# Patient Record
Sex: FEMALE | Race: WHITE | Employment: OTHER | URBAN - METROPOLITAN AREA
[De-identification: names, ages, dates, MRNs, and addresses within clinical notes are randomized per-mention and may not be internally consistent; named-entity substitution may affect disease eponyms.]

---

## 2017-04-11 ENCOUNTER — OFFICE VISIT (OUTPATIENT)
Dept: ONCOLOGY | Age: 62
End: 2017-04-11

## 2017-04-11 VITALS
SYSTOLIC BLOOD PRESSURE: 126 MMHG | OXYGEN SATURATION: 97 % | HEART RATE: 85 BPM | RESPIRATION RATE: 18 BRPM | TEMPERATURE: 97.3 F | BODY MASS INDEX: 55.32 KG/M2 | HEIGHT: 61 IN | DIASTOLIC BLOOD PRESSURE: 76 MMHG | WEIGHT: 293 LBS

## 2017-04-11 DIAGNOSIS — C50.912 MALIGNANT NEOPLASM OF LEFT FEMALE BREAST, UNSPECIFIED SITE OF BREAST: Primary | ICD-10-CM

## 2017-04-11 DIAGNOSIS — R23.2 HOT FLASHES RELATED TO AROMATASE INHIBITOR THERAPY: ICD-10-CM

## 2017-04-11 DIAGNOSIS — F32.A DEPRESSION, UNSPECIFIED DEPRESSION TYPE: ICD-10-CM

## 2017-04-11 DIAGNOSIS — T45.1X5A HOT FLASHES RELATED TO AROMATASE INHIBITOR THERAPY: ICD-10-CM

## 2017-04-11 RX ORDER — FAMOTIDINE 20 MG/1
20 TABLET, FILM COATED ORAL 2 TIMES DAILY
COMMUNITY
End: 2020-09-14

## 2017-04-11 RX ORDER — OXYCODONE AND ACETAMINOPHEN 10; 325 MG/1; MG/1
TABLET ORAL
COMMUNITY
End: 2018-11-07 | Stop reason: ALTCHOICE

## 2017-04-11 RX ORDER — LIDOCAINE HYDROCHLORIDE 20 MG/ML
15 SOLUTION OROPHARYNGEAL AS NEEDED
COMMUNITY
End: 2020-02-25

## 2017-04-11 RX ORDER — NAPROXEN 375 MG/1
375 TABLET ORAL 2 TIMES DAILY WITH MEALS
COMMUNITY
End: 2018-11-20 | Stop reason: ALTCHOICE

## 2017-04-11 RX ORDER — TOLTERODINE 4 MG/1
4 CAPSULE, EXTENDED RELEASE ORAL DAILY
COMMUNITY
End: 2020-02-25

## 2017-04-11 RX ORDER — CARVEDILOL 3.12 MG/1
TABLET ORAL 2 TIMES DAILY WITH MEALS
COMMUNITY
End: 2020-02-25

## 2017-04-11 RX ORDER — KETOCONAZOLE 20 MG/G
CREAM TOPICAL DAILY
COMMUNITY
End: 2020-09-14

## 2017-04-11 RX ORDER — LISINOPRIL 5 MG/1
TABLET ORAL DAILY
COMMUNITY
End: 2020-02-25

## 2017-04-11 RX ORDER — ACETAMINOPHEN 500 MG
1000 TABLET ORAL 3 TIMES DAILY
Status: ON HOLD | COMMUNITY
End: 2020-09-18

## 2017-04-11 RX ORDER — FUROSEMIDE 40 MG/1
TABLET ORAL 2 TIMES DAILY
COMMUNITY
End: 2020-02-25

## 2017-04-11 RX ORDER — SILVER SULFADIAZINE 10 G/1000G
CREAM TOPICAL DAILY
COMMUNITY
End: 2020-09-14

## 2017-04-11 RX ORDER — METFORMIN HYDROCHLORIDE 850 MG/1
TABLET ORAL 2 TIMES DAILY WITH MEALS
COMMUNITY
End: 2020-02-25

## 2017-04-11 RX ORDER — DIAZEPAM 5 MG/1
5 TABLET ORAL DAILY
COMMUNITY
End: 2020-09-21

## 2017-04-11 RX ORDER — SUCRALFATE 1 G/10ML
SUSPENSION ORAL 4 TIMES DAILY
COMMUNITY
End: 2018-11-07 | Stop reason: ALTCHOICE

## 2017-04-11 NOTE — PROGRESS NOTES
[id number] [facility]  [phone]    Hematology / Oncology Followup    Reason for Visit:   Ever Fu is a 62 y.o. female who is seen for follow up of breast cancer. Treatment History:   · Mammogram and ultrasound 4/25/2014 at [facility]: BIRADS 5 left breast abnormality, increasing in size compared with exam 6 months prior  · Patient declined preoperative biopsy  · Left lumpectomy and sentinel node dissection 5/28/2014 with Dr. Niki Sher: 2cm infiltrating ductal carcinoma, grade 3, %, NH 30%, HER2/jesusita negative, Ki67 30%, 0/12 nodes. Prosigna score 77 = high risk. · Stage IA (pT1c pN0 Mx) Left Breast Cancer  · Chemotherapy with TC x4 cycles from 7/10/14 to 9/11/2014  · Adjuvant radiation with Dr. Yen Mccoy 11/12/14 - 12/17/14  · Adjuvant endocrine therapy with Anastrozole beginning 1/3/2015     History of Present Illness:   She reports some problems with urinary incontinence, following with urology. Passed a kidney stone since last here as well. Getting home health with wound care to address a wound in her skin folds. She continues to take anastrozole, compliant with therapy. Tolerating it well. Hot flashes only occasionally, tolerable. No breast complaints. PAST HISTORY: The following sections were reviewed and updated in the EMR as appropriate: PMH, SH, FH, Medications, Allergies. She lives with her  in [facility], about an hour from here. She is confined to a wheelchair/scooter secondary to her morbid obesity and associated arthritis, but is able to get up to transfer. No Known Allergies     Review of Systems: A complete review of systems was obtained, reviewed, and scanned into the EMR. Pertinent findings reviewed above.       Physical Exam:     Visit Vitals    /76 (BP 1 Location: Right arm, BP Patient Position: Sitting)    Pulse 85    Temp 97.3 °F (36.3 °C) (Oral)    Resp 18    Ht 5' 1\" (1.549 m)    Wt (!) 368 lb (166.9 kg)    SpO2 97%    BMI 69.53 kg/m2     ECOG PS: 0  General: No distress  Eyes: PERRLA, anicteric sclerae  HENT: Atraumatic, OP clear. Neck: Supple  Lymphatic: No cervical, supraclavicular, or inguinal adenopathy  Respiratory: CTAB, normal respiratory effort  CV: Normal rate and rhythm, no murmurs, 1+ peripheral edema  GI: Soft, morbidly obese, nontender, nondistended, no masses. MS: Sitting in wheelchair. Digits without clubbing or cyanosis. Skin: No ecchymoses, or petechiae. Normal temperature, turgor, and texture. Psych: Alert, oriented, appropriate affect, normal judgment/insight    Results:     Lab Results   Component Value Date/Time    WBC 7.9 09/30/2014 09:10 AM    HGB 11.2 09/30/2014 09:10 AM    HCT 34.0 09/30/2014 09:10 AM    PLATELET 079 82/85/9615 09:10 AM    MCV 90.9 09/30/2014 09:10 AM    ABS. NEUTROPHILS 5.3 09/30/2014 09:10 AM    Hemoglobin (POC) 10.2 09/19/2014 02:56 PM    Hematocrit (POC) 30 09/19/2014 02:56 PM     Lab Results   Component Value Date/Time    Sodium 140 05/23/2014 02:40 PM    Potassium 4.4 05/23/2014 02:40 PM    Chloride 102 05/23/2014 02:40 PM    CO2 29 05/23/2014 02:40 PM    Glucose 143 05/23/2014 02:40 PM    BUN 17 05/23/2014 02:40 PM    Creatinine 0.61 05/23/2014 02:40 PM    GFR est AA >60 05/23/2014 02:40 PM    GFR est non-AA >60 05/23/2014 02:40 PM    Calcium 9.2 05/23/2014 02:40 PM    Sodium (POC) 138 09/19/2014 02:56 PM    Potassium (POC) 4.5 09/19/2014 02:56 PM    Chloride (POC) 102 09/19/2014 02:56 PM    Glucose (POC) 102 09/19/2014 02:56 PM    BUN (POC) 19 09/19/2014 02:56 PM    Creatinine (POC) 1.1 09/19/2014 02:56 PM    Calcium, ionized (POC) 1.17 09/19/2014 02:56 PM     Lab Results   Component Value Date/Time    Bilirubin, total 0.4 05/23/2014 02:40 PM    ALT (SGPT) 38 05/23/2014 02:40 PM    AST (SGOT) 22 05/23/2014 02:40 PM    Alk.  phosphatase 91 05/23/2014 02:40 PM    Protein, total 7.1 05/23/2014 02:40 PM    Albumin 3.4 05/23/2014 02:40 PM Globulin 3.7 05/23/2014 02:40 PM       DEXA 11/11/2014: normal   DEXA 11/03/2016: normal    Mammogram 7/07/2015: benign  Mammogram 6/28/2016: benign      Assessment:   1) Breast Cancer, Left  Stage IA, ER/OR positive, HER2 negative, high risk prosigna score  She is s/p resection followed by adjuvant chemotherapy with TC x 4 and adjuvant radiation. She is now on adjuvant endocrine therapy with anastrozole, with plans for 5 years of therapy. She is tolerating therapy well. She has no evidence of disease recurrence at this time. We will continue with therapy and active surveillance. 2) Bone Health  Increased risk of osteoporosis while on AI therapy. Continue calcium and vitamin D. Last DEXA normal, repeat every 2 years while on AI. 3) Depression  Doing better. Seeing psychiatry tomorrow. 4) Hot flashes  Mild. Likely related to AI. Monitor for now.     Plan:     · Continue anastrazole 1mg PO daily through 1/2020  · Continue Calcium, Vitamin D  · Mammogram yearly, due 6/2017 at time of surgery appt  · Return to see me in 9/2017      Signed By: Romain Coronado MD     April 11, 2017

## 2017-04-11 NOTE — MR AVS SNAPSHOT
Visit Information Date & Time Provider Department Dept. Phone Encounter #  
 4/11/2017 10:00 AM Kevin Nelson MD [facility] at [address] [place] [address] [id number] Follow-up Instructions Return in about 5 months (around 9/11/2017) for Lida breast AI fu.  
 Follow-up and Disposition History Upcoming Health Maintenance Date Due Hepatitis C Screening 1955 DTaP/Tdap/Td series (1 - Tdap) 9/4/1977 PAP AKA CERVICAL CYTOLOGY 9/4/1977 FOBT Q 1 YEAR AGE 50-75 9/4/2006 Pneumococcal 19-64 Highest Risk (2 of 3 - PCV13) 7/31/2013 INFLUENZA AGE 9 TO ADULT 8/1/2016 BREAST CANCER SCRN MAMMOGRAM 6/28/2017 Allergies as of 4/11/2017  Review Complete On: 4/11/2017 By: Kevin Nelson MD  
 No Known Allergies Current Immunizations  Reviewed on 9/19/2014 Name Date Influenza Vaccine 3/12/2014 Pneumococcal Vaccine (Unspecified Type) 7/31/2012 Zoster Vaccine, Live 3/12/2014 Not reviewed this visit You Were Diagnosed With   
  
 Codes Comments Malignant neoplasm of left female breast, unspecified site of breast ([facility] [address]    -  Primary ICD-10-CM: X68.526 ICD-9-CM: 174.9 Hot flashes related to aromatase inhibitor therapy     ICD-10-CM: R23.2, T45.1X5A 
ICD-9-CM: 782.62, E933.1 Depression, unspecified depression type     ICD-10-CM: F32.9 ICD-9-CM: 683 Vitals BP Pulse Temp Resp Height(growth percentile) Weight(growth percentile) 126/76 (BP 1 Location: Right arm, BP Patient Position: Sitting) 85 97.3 °F (36.3 °C) (Oral) 18 5' 1\" (1.549 m) (!) 368 lb (166.9 kg) SpO2 BMI OB Status Smoking Status 97% 69.53 kg/m2 Postmenopausal Former Smoker BMI and BSA Data Body Mass Index Body Surface Area  
 69.53 kg/m 2 2.68 m 2 Preferred Pharmacy Pharmacy Name Phone [facility] [address] [facility], [address] Your Updated Medication List  
  
   
 This list is accurate as of: 4/11/17 10:45 AM.  Always use your most recent med list.  
  
  
  
  
 acetaminophen 500 mg tablet Commonly known as:  TYLENOL Take 1,000 mg by mouth three (3) times daily. anastrozole 1 mg tablet Commonly known as:  ARIMIDEX TAKE ONE TABLET BY MOUTH ONCE DAILY CALCIUM 600 PO Take  by mouth. CARAFATE 100 mg/mL suspension Generic drug:  sucralfate Take  by mouth four (4) times daily. carvedilol 3.125 mg tablet Commonly known as:  Kelton Pulse Take  by mouth two (2) times daily (with meals). diazePAM 5 mg tablet Commonly known as:  VALIUM Take 5 mg by mouth daily. famotidine 20 mg tablet Commonly known as:  PEPCID Take 20 mg by mouth two (2) times a day. furosemide 40 mg tablet Commonly known as:  LASIX Take  by mouth two (2) times a day. GLUCOPHAGE 850 mg tablet Generic drug:  metFORMIN Take  by mouth two (2) times daily (with meals). GLUCOSAMINE 1500 COMPLEX PO Take  by mouth.  
  
 ketoconazole 2 % topical cream  
Commonly known as:  NIZORAL Apply  to affected area daily. LIDOCAINE VISCOUS 2 % solution Generic drug:  lidocaine Take 15 mL by mouth as needed for Pain. LIPITOR 20 mg tablet Generic drug:  atorvastatin Take  by mouth daily. lisinopril 5 mg tablet Commonly known as:  Rekha Dio Take  by mouth daily. naproxen 375 mg tablet Commonly known as:  NAPROSYN Take 375 mg by mouth two (2) times daily (with meals). NASONEX 50 mcg/actuation nasal spray Generic drug:  mometasone 2 Sprays daily. NYSTOP powder Generic drug:  nystatin Apply  to affected area as needed. oxyCODONE-acetaminophen  mg per tablet Commonly known as:  PERCOCET 10 Take  by mouth every six (6) hours as needed for Pain.  
  
 pantoprazole 20 mg tablet Commonly known as:  PROTONIX Take 1 Tab by mouth two (2) times a day. potassium chloride 20 mEq tablet Commonly known as:  K-DUR, KLOR-CON Take  by mouth two (2) times a day. rOPINIRole 2 mg tablet Commonly known as:  Rico Sicks Take  by mouth nightly. sertraline 100 mg tablet Commonly known as:  ZOLOFT Take 2 Tabs by mouth daily. SILVADENE 1 % topical cream  
Generic drug:  silver sulfADIAZINE Apply  to affected area daily. SYNTHROID 100 mcg tablet Generic drug:  levothyroxine Take 125 mcg by mouth Daily (before breakfast). tolterodine ER 4 mg ER capsule Commonly known as:  Claudio Dasen Take 4 mg by mouth daily. TYLENOL COLD AND FLU SEVERE 5--200 mg Tab Generic drug:  elrvmlbve-kq-tscswmoy-guaifen Take  by mouth. VENTOLIN HFA 90 mcg/actuation inhaler Generic drug:  albuterol Take  by inhalation as needed. ZyrTEC 10 mg tablet Generic drug:  cetirizine Take  by mouth daily. Follow-up Instructions Return in about 5 months (around 9/11/2017) for darrell Hilton.  
  
  
Introducing [facility] SERVICES! Dear Fernandez Champion: 
Thank you for requesting a Subway account. Our records indicate that you already have an active Subway account. You can access your account anytime at [email or web address] Did you know that you can access your hospital and ER discharge instructions at any time in Subway? You can also review all of your test results from your hospital stay or ER visit. Additional Information If you have questions, please visit the Frequently Asked Questions section of the Subway website at [email or web address] Remember, Subway is NOT to be used for urgent needs. For medical emergencies, dial 911. Now available from your iPhone and Android! Please provide this summary of care documentation to your next provider. Your primary care clinician is listed as Harini Smith.  If you have any questions after today's visit, please call [phone].

## 2017-07-13 DIAGNOSIS — Z12.39 BREAST CANCER SCREENING: Primary | ICD-10-CM

## 2017-07-20 ENCOUNTER — OFFICE VISIT (OUTPATIENT)
Dept: SURGERY | Age: 62
End: 2017-07-20

## 2017-07-20 ENCOUNTER — HOSPITAL ENCOUNTER (OUTPATIENT)
Dept: MAMMOGRAPHY | Age: 62
Discharge: HOME | End: 2017-07-20
Attending: SURGERY
Payer: MEDICAID

## 2017-07-20 VITALS
HEART RATE: 73 BPM | WEIGHT: 293 LBS | BODY MASS INDEX: 55.32 KG/M2 | DIASTOLIC BLOOD PRESSURE: 78 MMHG | HEIGHT: 61 IN | SYSTOLIC BLOOD PRESSURE: 122 MMHG

## 2017-07-20 DIAGNOSIS — Z12.39 BREAST CANCER SCREENING: ICD-10-CM

## 2017-07-20 DIAGNOSIS — Z85.3 HISTORY OF BREAST CANCER: ICD-10-CM

## 2017-07-20 DIAGNOSIS — Z85.3 HISTORY OF BREAST CANCER: Primary | ICD-10-CM

## 2017-07-20 DIAGNOSIS — Z85.3 HX: BREAST CANCER: Primary | ICD-10-CM

## 2017-07-20 PROCEDURE — 76642 ULTRASOUND BREAST LIMITED: CPT

## 2017-07-20 PROCEDURE — 77066 DX MAMMO INCL CAD BI: CPT

## 2017-07-20 NOTE — PATIENT INSTRUCTIONS
Breast Self-Exam: Care Instructions  Your Care Instructions  A breast self-exam is when you check your breasts for lumps or changes. This regular exam helps you learn how your breasts normally look and feel. Most breast problems or changes are not because of cancer. Breast self-exam is not a substitute for a mammogram. Having regular breast exams by your doctor and regular mammograms improve your chances of finding any problems with your breasts. Some women set a time each month to do a step-by-step breast self-exam. Other women like a less formal system. They might look at their breasts as they brush their teeth, or feel their breasts once in a while in the shower. If you notice a change in your breast, tell your doctor. Follow-up care is a key part of your treatment and safety. Be sure to make and go to all appointments, and call your doctor if you are having problems. Its also a good idea to know your test results and keep a list of the medicines you take. How do you do a breast self-exam?  · The best time to examine your breasts is usually one week after your menstrual period begins. Your breasts should not be tender then. If you do not have periods, you might do your exam on a day of the month that is easy to remember. · To examine your breasts:  ¨ Remove all your clothes above the waist and lie down. When you are lying down, your breast tissue spreads evenly over your chest wall, which makes it easier to feel all your breast tissue. ¨ Use the pads--not the fingertips--of the 3 middle fingers of your left hand to check your right breast. Move your fingers slowly in small coin-sized circles that overlap. ¨ Use three levels of pressure to feel of all your breast tissue. Use light pressure to feel the tissue close to the skin surface. Use medium pressure to feel a little deeper. Use firm pressure to feel your tissue close to your breastbone and ribs.  Use each pressure level to feel your breast tissue before moving on to the next spot. ¨ Check your entire breast, moving up and down as if following a strip from the collarbone to the bra line, and from the armpit to the ribs. Repeat until you have covered the entire breast.  ¨ Repeat this procedure for your left breast, using the pads of the 3 middle fingers of your right hand. · To examine your breasts while in the shower:  ¨ Place one arm over your head and lightly soap your breast on that side. ¨ Using the pads of your fingers, gently move your hand over your breast (in the strip pattern described above), feeling carefully for any lumps or changes. ¨ Repeat for the other breast.  · Have your doctor inspect anything you notice to see if you need further testing. Where can you learn more? Go to http://zane-hannah.info/. Enter P148 in the search box to learn more about \"Breast Self-Exam: Care Instructions. \"  Current as of: July 26, 2016  Content Version: 11.3  © 7849-2595 [employer]. Care instructions adapted under license by [employer] (which disclaims liability or warranty for this information). If you have questions about a medical condition or this instruction, always ask your healthcare professional. [place] [address] any warranty or liability for your use of this information.

## 2017-07-20 NOTE — PROGRESS NOTES
HISTORY OF PRESENT ILLNESS  Denys Champagne is a 62 y.o. female. HPI   ESTABLISHED patient here today for follow up LEFT breast cancer. Patient palpated a tender lump in LEFT breast recently. She thinks she may have bumped up against her bed recently. Denies any other breast problems at this time. 5/2014 LEFT lumpectomy, 2cm Infiltrating ductal carcinoma, grade 3, 0/12 nodes, %. MS 30% Ki 30% (DICS, %. MS 50%), Her 2 neg  TC  XRT 12/2014  Anastrazole    Recent imaging-  Bilateral diagnostic mammogram and LEFT breast ultrasound today, 7/20/17- BIRADS 2  History: Palpable abnormality left breast, history of left lumpectomy in 2014.     Bilateral digital diagnostic mammography, interpreted in conjunction with CAD  over-read,  was performed. The breast parenchymal pattern is fatty replaced. Lumpectomy changes are seen in the left breast. No suspicious masses or  microcalcifications are seen.     Real-time ultrasonography of the left breast demonstrates a small echogenic  focus at the region of palpable abnormality which likely represents a small area  of fat necrosis.     IMPRESSION:  1. BI-RADS category 2, benign. The patient was instructed to follow the palpable  abnormality clinically and to return if this area became larger. 2. The patient should return in one year for routine bilateral mammography. 3. She was informed. ROS    Physical Exam   Pulmonary/Chest: Right breast exhibits no inverted nipple, no mass, no nipple discharge, no skin change and no tenderness. Left breast exhibits mass (1 cm subcutaneous mass 1:00 1/3). Left breast exhibits no inverted nipple, no nipple discharge, no skin change and no tenderness. Breasts are symmetrical.       Wheel chair bound   Lymphadenopathy:     She has no cervical adenopathy. She has no axillary adenopathy. Right: No supraclavicular adenopathy present. Left: No supraclavicular adenopathy present.        ASSESSMENT and PLAN    ICD-10-CM ICD-9-CM    1. HX: breast cancer Z85.3 V10.3       1. Hx LEFT breast cancer. Pt felt a mass LEFT breast.  It is not seen on mammogram and hyperechoic on ultrasound - inflammation or hematoma. Pt reassured that everything is fine. 2. She is tolerating anastrazole. She has occasional hot flashes.   3. Yearly mammogram.  Follow up with me next year  Granddaughter has thrombocytopenia-absent radius disease (no forearms)

## 2017-07-20 NOTE — MR AVS SNAPSHOT
Visit Information Date & Time Provider Department Dept. Phone Encounter #  
 7/20/2017  3:30 PM Kannan Villalobos [address] [facility] [phone] [id number] Your Appointments 10/18/2017 10:00 AM  
ESTABLISHED PATIENT with Chago Mcgregor MD  
[facility] at [facility]) Appt Note: 6 mo fu, [place] [address] [address]  
[phone]  
  
   
 [address] [facility] Upcoming Health Maintenance Date Due Hepatitis C Screening 1955 DTaP/Tdap/Td series (1 - Tdap) 9/4/1977 PAP AKA CERVICAL CYTOLOGY 9/4/1977 FOBT Q 1 YEAR AGE 50-75 9/4/2006 Pneumococcal 19-64 Highest Risk (2 of 3 - PCV13) 7/31/2013 BREAST CANCER SCRN MAMMOGRAM 6/28/2017 INFLUENZA AGE 9 TO ADULT 8/1/2017 Allergies as of 7/20/2017  Review Complete On: 7/20/2017 By: Zeynep Partida RN No Known Allergies Current Immunizations  Reviewed on 9/19/2014 Name Date Influenza Vaccine 3/12/2014 Pneumococcal Vaccine (Unspecified Type) 7/31/2012 Zoster Vaccine, Live 3/12/2014 Not reviewed this visit Vitals OB Status Smoking Status Postmenopausal Former Smoker Preferred Pharmacy Pharmacy Name Phone [facility] [address] [facility], [facility] [facility] Your Updated Medication List  
  
   
This list is accurate as of: 7/20/17  4:04 PM.  Always use your most recent med list.  
  
  
  
  
 acetaminophen 500 mg tablet Commonly known as:  TYLENOL Take 1,000 mg by mouth three (3) times daily. anastrozole 1 mg tablet Commonly known as:  ARIMIDEX TAKE ONE TABLET BY MOUTH ONCE DAILY CALCIUM 600 PO Take  by mouth. CARAFATE 100 mg/mL suspension Generic drug:  sucralfate Take  by mouth four (4) times daily. carvedilol 3.125 mg tablet Commonly known as:  Neri Levy  
 Take  by mouth two (2) times daily (with meals). diazePAM 5 mg tablet Commonly known as:  VALIUM Take 5 mg by mouth daily. famotidine 20 mg tablet Commonly known as:  PEPCID Take 20 mg by mouth two (2) times a day. furosemide 40 mg tablet Commonly known as:  LASIX Take  by mouth two (2) times a day. GLUCOPHAGE 850 mg tablet Generic drug:  metFORMIN Take  by mouth two (2) times daily (with meals). GLUCOSAMINE 1500 COMPLEX PO Take  by mouth.  
  
 ketoconazole 2 % topical cream  
Commonly known as:  NIZORAL Apply  to affected area daily. LIDOCAINE VISCOUS 2 % solution Generic drug:  lidocaine Take 15 mL by mouth as needed for Pain. LIPITOR 20 mg tablet Generic drug:  atorvastatin Take  by mouth daily. lisinopril 5 mg tablet Commonly known as:  Lollie Jump Take  by mouth daily. naproxen 375 mg tablet Commonly known as:  NAPROSYN Take 375 mg by mouth two (2) times daily (with meals). NASONEX 50 mcg/actuation nasal spray Generic drug:  mometasone 2 Sprays daily. NYSTOP powder Generic drug:  nystatin Apply  to affected area as needed. oxyCODONE-acetaminophen  mg per tablet Commonly known as:  PERCOCET 10 Take  by mouth every six (6) hours as needed for Pain.  
  
 pantoprazole 20 mg tablet Commonly known as:  PROTONIX Take 1 Tab by mouth two (2) times a day. potassium chloride 20 mEq tablet Commonly known as:  K-DUR, KLOR-CON Take  by mouth two (2) times a day. rOPINIRole 2 mg tablet Commonly known as:  Mauro Valerio Take  by mouth nightly. sertraline 100 mg tablet Commonly known as:  ZOLOFT Take 2 Tabs by mouth daily. SILVADENE 1 % topical cream  
Generic drug:  silver sulfADIAZINE Apply  to affected area daily. SYNTHROID 100 mcg tablet Generic drug:  levothyroxine Take 125 mcg by mouth Daily (before breakfast). tolterodine ER 4 mg ER capsule Commonly known as:  [place] Nones Take 4 mg by mouth daily. TYLENOL COLD AND FLU SEVERE 5--200 mg Tab Generic drug:  xycuyklad-je-eeevdevm-guaifen Take  by mouth. VENTOLIN HFA 90 mcg/actuation inhaler Generic drug:  albuterol Take  by inhalation as needed. ZyrTEC 10 mg tablet Generic drug:  cetirizine Take  by mouth daily. Introducing [facility] & HEALTH SERVICES! Dear [facility]: 
Thank you for requesting a VeraLight account. Our records indicate that you already have an active VeraLight account. You can access your account anytime at [email or web address] Did you know that you can access your hospital and ER discharge instructions at any time in VeraLight? You can also review all of your test results from your hospital stay or ER visit. Additional Information If you have questions, please visit the Frequently Asked Questions section of the VeraLight website at [email or web address] Remember, VeraLight is NOT to be used for urgent needs. For medical emergencies, dial 911. Now available from your iPhone and Android! Please provide this summary of care documentation to your next provider. Your primary care clinician is listed as Eileen Sesay. If you have any questions after today's visit, please call [phone].

## 2017-07-20 NOTE — COMMUNICATION BODY
HISTORY OF PRESENT ILLNESS  Sue Ty is a 62 y.o. female. HPI   ESTABLISHED patient here today for follow up LEFT breast cancer. Patient palpated a tender lump in LEFT breast recently. She thinks she may have bumped up against her bed recently. Denies any other breast problems at this time. 5/2014 LEFT lumpectomy, 2cm Infiltrating ductal carcinoma, grade 3, 0/12 nodes, %. DC 30% Ki 30% (DICS, %. DC 50%), Her 2 neg  TC  XRT 12/2014  Anastrazole    Recent imaging-  Bilateral diagnostic mammogram and LEFT breast ultrasound today, 7/20/17- BIRADS 2  History: Palpable abnormality left breast, history of left lumpectomy in 2014.     Bilateral digital diagnostic mammography, interpreted in conjunction with CAD  over-read,  was performed. The breast parenchymal pattern is fatty replaced. Lumpectomy changes are seen in the left breast. No suspicious masses or  microcalcifications are seen.     Real-time ultrasonography of the left breast demonstrates a small echogenic  focus at the region of palpable abnormality which likely represents a small area  of fat necrosis.     IMPRESSION:  1. BI-RADS category 2, benign. The patient was instructed to follow the palpable  abnormality clinically and to return if this area became larger. 2. The patient should return in one year for routine bilateral mammography. 3. She was informed. ROS    Physical Exam   Pulmonary/Chest: Right breast exhibits no inverted nipple, no mass, no nipple discharge, no skin change and no tenderness. Left breast exhibits mass (1 cm subcutaneous mass 1:00 1/3). Left breast exhibits no inverted nipple, no nipple discharge, no skin change and no tenderness. Breasts are symmetrical.       Wheel chair bound   Lymphadenopathy:     She has no cervical adenopathy. She has no axillary adenopathy. Right: No supraclavicular adenopathy present. Left: No supraclavicular adenopathy present.        ASSESSMENT and PLAN    ICD-10-CM ICD-9-CM    1. HX: breast cancer Z85.3 V10.3       1. Hx LEFT breast cancer. Pt felt a mass LEFT breast.  It is not seen on mammogram and hyperechoic on ultrasound - inflammation or hematoma. Pt reassured that everything is fine. 2. She is tolerating anastrazole. She has occasional hot flashes.   3. Yearly mammogram.  Follow up with me next year  Granddaughter has thrombocytopenia-absent radius disease (no forearms)

## 2017-10-06 ENCOUNTER — OP HISTORICAL/CONVERTED ENCOUNTER (OUTPATIENT)
Dept: OTHER | Age: 62
End: 2017-10-06

## 2017-10-18 ENCOUNTER — OFFICE VISIT (OUTPATIENT)
Dept: ONCOLOGY | Age: 62
End: 2017-10-18

## 2017-10-18 VITALS
TEMPERATURE: 95.5 F | OXYGEN SATURATION: 98 % | DIASTOLIC BLOOD PRESSURE: 66 MMHG | SYSTOLIC BLOOD PRESSURE: 169 MMHG | HEART RATE: 74 BPM | RESPIRATION RATE: 20 BRPM

## 2017-10-18 DIAGNOSIS — N63.0 BREAST LUMP: ICD-10-CM

## 2017-10-18 DIAGNOSIS — F32.A DEPRESSION, UNSPECIFIED DEPRESSION TYPE: ICD-10-CM

## 2017-10-18 DIAGNOSIS — Z17.0 MALIGNANT NEOPLASM OF LEFT BREAST IN FEMALE, ESTROGEN RECEPTOR POSITIVE, UNSPECIFIED SITE OF BREAST (HCC): Primary | ICD-10-CM

## 2017-10-18 DIAGNOSIS — T45.1X5A HOT FLASHES RELATED TO AROMATASE INHIBITOR THERAPY: ICD-10-CM

## 2017-10-18 DIAGNOSIS — R23.2 HOT FLASHES RELATED TO AROMATASE INHIBITOR THERAPY: ICD-10-CM

## 2017-10-18 DIAGNOSIS — C50.912 MALIGNANT NEOPLASM OF LEFT BREAST IN FEMALE, ESTROGEN RECEPTOR POSITIVE, UNSPECIFIED SITE OF BREAST (HCC): Primary | ICD-10-CM

## 2017-10-18 NOTE — PROGRESS NOTES
[address] [facility]cology at [facility]  [phone]    Hematology / Oncology Followup    Reason for Visit:   Victor M Bazan is a 65 y.o. female who is seen for follow up of breast cancer. Treatment History:   · Mammogram and ultrasound 4/25/2014 at [facility]: BIRADS 5 left breast abnormality, increasing in size compared with exam 6 months prior  · Patient declined preoperative biopsy  · Left lumpectomy and sentinel node dissection 5/28/2014 with Dr. Cardona Cuff: 2cm infiltrating ductal carcinoma, grade 3, %, CA 30%, HER2/jesusita negative, Ki67 30%, 0/12 nodes. Prosigna score 77 = high risk. · Stage IA (pT1c pN0 Mx) Left Breast Cancer  · Chemotherapy with TC x4 cycles from 7/10/14 to 9/11/2014  · Adjuvant radiation with Dr. Adriana Penn 11/12/14 - 12/17/14  · Adjuvant endocrine therapy with Anastrozole beginning 1/3/2015     History of Present Illness:   She reports having some issues with sores developing in her skin folds, seeing wound care for this. Apparently they are recommending a panniculectomy and a lapband surgery, per the patient. She is quite fearful about this. Mood remains depressed. Sleeping a lot. She continues to take anastrozole, compliant with therapy. Tolerating it well. Hot flashes only occasionally, tolerable. No breast complaints. PAST HISTORY: The following sections were reviewed and updated in the EMR as appropriate: PMH, SH, FH, Medications, Allergies. She lives with her  in [facility], about an hour from here. She is confined to a wheelchair/scooter secondary to her morbid obesity and associated arthritis, but is able to get up to transfer. No Known Allergies     Review of Systems: A complete review of systems was obtained, reviewed, and scanned into the EMR. Pertinent findings reviewed above. Physical Exam:     There were no vitals taken for this visit.   ECOG PS: 0  General: No distress  Eyes: PERRLA, anicteric sclerae  HENT: Atraumatic, OP clear. Neck: Supple  Lymphatic: No cervical, supraclavicular, or inguinal adenopathy  Respiratory: CTAB, normal respiratory effort  CV: Normal rate and rhythm, no murmurs, 1+ peripheral edema  GI: Soft, morbidly obese, nontender, nondistended, no masses. MS: Sitting in wheelchair. Digits without clubbing or cyanosis. Skin: No ecchymoses, or petechiae. Normal temperature, turgor, and texture. Psych: Alert, oriented, appropriate affect, normal judgment/insight    Results:     Lab Results   Component Value Date/Time    WBC 7.9 09/30/2014 09:10 AM    HGB 11.2 09/30/2014 09:10 AM    HCT 34.0 09/30/2014 09:10 AM    PLATELET 556 03/63/1440 09:10 AM    MCV 90.9 09/30/2014 09:10 AM    ABS. NEUTROPHILS 5.3 09/30/2014 09:10 AM    Hemoglobin (POC) 10.2 09/19/2014 02:56 PM    Hematocrit (POC) 30 09/19/2014 02:56 PM     Lab Results   Component Value Date/Time    Sodium 140 05/23/2014 02:40 PM    Potassium 4.4 05/23/2014 02:40 PM    Chloride 102 05/23/2014 02:40 PM    CO2 29 05/23/2014 02:40 PM    Glucose 143 05/23/2014 02:40 PM    BUN 17 05/23/2014 02:40 PM    Creatinine 0.61 05/23/2014 02:40 PM    GFR est AA >60 05/23/2014 02:40 PM    GFR est non-AA >60 05/23/2014 02:40 PM    Calcium 9.2 05/23/2014 02:40 PM    Sodium (POC) 138 09/19/2014 02:56 PM    Potassium (POC) 4.5 09/19/2014 02:56 PM    Chloride (POC) 102 09/19/2014 02:56 PM    Glucose (POC) 102 09/19/2014 02:56 PM    BUN (POC) 19 09/19/2014 02:56 PM    Creatinine (POC) 1.1 09/19/2014 02:56 PM    Calcium, ionized (POC) 1.17 09/19/2014 02:56 PM     Lab Results   Component Value Date/Time    Bilirubin, total 0.4 05/23/2014 02:40 PM    ALT (SGPT) 38 05/23/2014 02:40 PM    AST (SGOT) 22 05/23/2014 02:40 PM    Alk.  phosphatase 91 05/23/2014 02:40 PM    Protein, total 7.1 05/23/2014 02:40 PM    Albumin 3.4 05/23/2014 02:40 PM    Globulin 3.7 05/23/2014 02:40 PM       DEXA 11/11/2014: normal   DEXA 11/03/2016: normal    Mammogram 7/07/2015: benign  Mammogram 6/28/2016: benign  Bilateral mammogram and left breast US 7/20/2017: benign    Assessment:   1) Breast Cancer, Left  Stage IA, ER/CA positive, HER2 negative, high risk prosigna score  She is s/p resection followed by adjuvant chemotherapy with TC x 4 and adjuvant radiation. She is now on adjuvant endocrine therapy with anastrozole, with plans for 5 years of therapy. She is tolerating therapy well. She has no evidence of disease recurrence at this time. We will continue with therapy and active surveillance. 2) Bone Health  Increased risk of osteoporosis while on AI therapy. Continue calcium and vitamin D. Last DEXA normal, repeat every 2 years while on AI. 3) Depression  Persists. Continues to follow with psychiatry. 4) Hot flashes  Mild. Likely related to AI. Monitor for now. 5) Left breast lump  Evaluated by surgery, as well as ultrasound, and felt to be benign. Monitor. 6) Obesity  Discussed at length. She is considering lapband, on the recommendation of her wound care specialist and urologist.  No contraindications from an oncology perspective.     Plan:     · Continue anastrazole 1mg PO daily through 1/2020  · Continue Calcium, Vitamin D  · Mammogram yearly, due 7/2018 at time of surgery appt  · Return to see me in 6 months      Signed By: Sariah Almaguer MD     October 18, 2017

## 2017-10-18 NOTE — MR AVS SNAPSHOT
Visit Information Date & Time Provider Department Dept. Phone Encounter #  
 10/18/2017 10:00 AM Capo Sosa MD [facility] at [address] [place] [address] Rd [id number] Follow-up Instructions Return in about 6 months (around 4/18/2018) for Lida breast AI fu. Upcoming Health Maintenance Date Due Hepatitis C Screening 1955 DTaP/Tdap/Td series (1 - Tdap) 9/4/1977 PAP AKA CERVICAL CYTOLOGY 9/4/1977 FOBT Q 1 YEAR AGE 50-75 9/4/2006 Pneumococcal 19-64 Highest Risk (2 of 3 - PCV13) 7/31/2013 INFLUENZA AGE 9 TO ADULT 8/1/2017 BREAST CANCER SCRN MAMMOGRAM 7/20/2018 Allergies as of 10/18/2017  Review Complete On: 10/18/2017 By: Capo Sosa MD  
 No Known Allergies Current Immunizations  Reviewed on 9/19/2014 Name Date Influenza Vaccine 3/12/2014 Pneumococcal Vaccine (Unspecified Type) 7/31/2012 Zoster Vaccine, Live 3/12/2014 Not reviewed this visit You Were Diagnosed With   
  
 Codes Comments Malignant neoplasm of left breast in female, estrogen receptor positive, unspecified site of breast ([facility] [address]    -  Primary ICD-10-CM: C50.912, Z17.0 ICD-9-CM: 174.9, V86.0 Depression, unspecified depression type     ICD-10-CM: F32.9 ICD-9-CM: 249 Hot flashes related to aromatase inhibitor therapy     ICD-10-CM: R23.2, T45.1X5A 
ICD-9-CM: 782.62, E933.1 Breast lump     ICD-10-CM: N63.0 ICD-9-CM: 611.72 Vitals BP Pulse Temp Resp SpO2 OB Status 169/66 (BP 1 Location: Right arm, BP Patient Position: Sitting) 74 95.5 °F (35.3 °C) (Temporal) 20 98% Postmenopausal  
 Smoking Status Former Smoker Vitals History Preferred Pharmacy Pharmacy Name Phone [facility] [address] [facility], [address] Your Updated Medication List  
  
   
This list is accurate as of: 10/18/17 11:00 AM.  Always use your most recent med list.  
  
  
  
  
 acetaminophen 500 mg tablet Commonly known as:  TYLENOL Take 1,000 mg by mouth three (3) times daily. anastrozole 1 mg tablet Commonly known as:  ARIMIDEX TAKE ONE TABLET BY MOUTH ONCE DAILY CALCIUM 600 PO Take  by mouth. CARAFATE 100 mg/mL suspension Generic drug:  sucralfate Take  by mouth four (4) times daily. carvedilol 3.125 mg tablet Commonly known as:  Kathlean Due Take  by mouth two (2) times daily (with meals). diazePAM 5 mg tablet Commonly known as:  VALIUM Take 5 mg by mouth daily. famotidine 20 mg tablet Commonly known as:  PEPCID Take 20 mg by mouth two (2) times a day. furosemide 40 mg tablet Commonly known as:  LASIX Take  by mouth two (2) times a day. GLUCOPHAGE 850 mg tablet Generic drug:  metFORMIN Take  by mouth two (2) times daily (with meals). GLUCOSAMINE 1500 COMPLEX PO Take  by mouth.  
  
 ketoconazole 2 % topical cream  
Commonly known as:  NIZORAL Apply  to affected area daily. LIDOCAINE VISCOUS 2 % solution Generic drug:  lidocaine Take 15 mL by mouth as needed for Pain. LIPITOR 20 mg tablet Generic drug:  atorvastatin Take  by mouth daily. lisinopril 5 mg tablet Commonly known as:  Sofia November Take  by mouth daily. naproxen 375 mg tablet Commonly known as:  NAPROSYN Take 375 mg by mouth two (2) times daily (with meals). NASONEX 50 mcg/actuation nasal spray Generic drug:  mometasone 2 Sprays daily. NYSTOP powder Generic drug:  nystatin Apply  to affected area as needed. oxyCODONE-acetaminophen  mg per tablet Commonly known as:  PERCOCET 10 Take  by mouth every six (6) hours as needed for Pain.  
  
 pantoprazole 20 mg tablet Commonly known as:  PROTONIX Take 1 Tab by mouth two (2) times a day. potassium chloride 20 mEq tablet Commonly known as:  K-DUR, KLOR-CON Take  by mouth two (2) times a day. rOPINIRole 2 mg tablet Commonly known as:  Neelam Glimpse Take  by mouth nightly. sertraline 100 mg tablet Commonly known as:  ZOLOFT Take 2 Tabs by mouth daily. SILVADENE 1 % topical cream  
Generic drug:  silver sulfADIAZINE Apply  to affected area daily. SYNTHROID 100 mcg tablet Generic drug:  levothyroxine Take 125 mcg by mouth Daily (before breakfast). tolterodine ER 4 mg ER capsule Commonly known as:  Bull Upperglade Take 4 mg by mouth daily. TYLENOL COLD AND FLU SEVERE 5--200 mg Tab Generic drug:  wwukroiab-ls-wjtdcdsm-guaifen Take  by mouth. VENTOLIN HFA 90 mcg/actuation inhaler Generic drug:  albuterol Take  by inhalation as needed. ZyrTEC 10 mg tablet Generic drug:  cetirizine Take  by mouth daily. Follow-up Instructions Return in about 6 months (around 4/18/2018) for darrell Hilton.  
  
  
Introducing [facility]! Dear Jeffrey Delacruz: 
Thank you for requesting a [employer] account. Our records indicate that you already have an active [employer] account. You can access your account anytime at [email or web address] Did you know that you can access your hospital and ER discharge instructions at any time in [employer]? You can also review all of your test results from your hospital stay or ER visit. Additional Information If you have questions, please visit the Frequently Asked Questions section of the [employer] website at [email or web address] Remember, [employer] is NOT to be used for urgent needs. For medical emergencies, dial 911. Now available from your iPhone and Android! Please provide this summary of care documentation to your next provider. If you have any questions after today's visit, please call [phone].

## 2017-10-27 ENCOUNTER — OP HISTORICAL/CONVERTED ENCOUNTER (OUTPATIENT)
Dept: OTHER | Age: 62
End: 2017-10-27

## 2017-11-03 ENCOUNTER — OFFICE VISIT (OUTPATIENT)
Dept: SURGERY | Age: 62
End: 2017-11-03

## 2017-11-03 ENCOUNTER — OP HISTORICAL/CONVERTED ENCOUNTER (OUTPATIENT)
Dept: OTHER | Age: 62
End: 2017-11-03

## 2017-11-03 VITALS
WEIGHT: 293 LBS | BODY MASS INDEX: 55.32 KG/M2 | DIASTOLIC BLOOD PRESSURE: 80 MMHG | SYSTOLIC BLOOD PRESSURE: 133 MMHG | HEIGHT: 61 IN | HEART RATE: 81 BPM

## 2017-11-03 DIAGNOSIS — N63.20 BREAST MASS, LEFT: Primary | ICD-10-CM

## 2017-11-03 DIAGNOSIS — Z85.3 HX: BREAST CANCER: ICD-10-CM

## 2017-11-03 NOTE — PATIENT INSTRUCTIONS
Breast Self-Exam: Care Instructions  Your Care Instructions    A breast self-exam is when you check your breasts for lumps or changes. This regular exam helps you learn how your breasts normally look and feel. Most breast problems or changes are not because of cancer. Breast self-exam is not a substitute for a mammogram. Having regular breast exams by your doctor and regular mammograms improve your chances of finding any problems with your breasts. Some women set a time each month to do a step-by-step breast self-exam. Other women like a less formal system. They might look at their breasts as they brush their teeth, or feel their breasts once in a while in the shower. If you notice a change in your breast, tell your doctor. Follow-up care is a key part of your treatment and safety. Be sure to make and go to all appointments, and call your doctor if you are having problems. It's also a good idea to know your test results and keep a list of the medicines you take. How do you do a breast self-exam?  · The best time to examine your breasts is usually one week after your menstrual period begins. Your breasts should not be tender then. If you do not have periods, you might do your exam on a day of the month that is easy to remember. · To examine your breasts:  ¨ Remove all your clothes above the waist and lie down. When you are lying down, your breast tissue spreads evenly over your chest wall, which makes it easier to feel all your breast tissue. ¨ Use the pads-not the fingertips-of the 3 middle fingers of your left hand to check your right breast. Move your fingers slowly in small coin-sized circles that overlap. ¨ Use three levels of pressure to feel of all your breast tissue. Use light pressure to feel the tissue close to the skin surface. Use medium pressure to feel a little deeper. Use firm pressure to feel your tissue close to your breastbone and ribs.  Use each pressure level to feel your breast tissue before moving on to the next spot. ¨ Check your entire breast, moving up and down as if following a strip from the collarbone to the bra line, and from the armpit to the ribs. Repeat until you have covered the entire breast.  ¨ Repeat this procedure for your left breast, using the pads of the 3 middle fingers of your right hand. · To examine your breasts while in the shower:  ¨ Place one arm over your head and lightly soap your breast on that side. ¨ Using the pads of your fingers, gently move your hand over your breast (in the strip pattern described above), feeling carefully for any lumps or changes. ¨ Repeat for the other breast.  · Have your doctor inspect anything you notice to see if you need further testing. Where can you learn more? Go to http://zane-hannah.info/. Enter P148 in the search box to learn more about \"Breast Self-Exam: Care Instructions. \"  Current as of: May 12, 2017  Content Version: 11.4  © 3505-1754 [employer]. Care instructions adapted under license by [employer] (which disclaims liability or warranty for this information). If you have questions about a medical condition or this instruction, always ask your healthcare professional. [place] [address] any warranty or liability for your use of this information.

## 2017-11-03 NOTE — PROGRESS NOTES
HISTORY OF PRESENT ILLNESS  Luis Angel Morataya is a 65 y.o. female. Breast Cancer     Breast Problem     ESTABLISHED patient here today for LEFT breast knot. She has palpated a knot in LEFT breast and was seen on imaging in 7/2017. States the knot has increased in size. Denies any breast pain, nipple discharge/retraction or skin changes. History of breast cancer-  5/2014- LEFT lumpectomy, 2cm Infiltrating ductal carcinoma, grade 3, 0/12 nodes, %. SC 30% Ki 30% (DICS, %. SC 50%), Her 2 neg  Completed chemotherapy-TC  12/2014- completed radiation. Followed by Dr. Chester Joshi. Currently taking Anastrazole. Followed by Dr. Yaneth Zavala. Past Surgical History:   Procedure Laterality Date    HX BREAST BIOPSY  5/28/2014    LEFT    HX BREAST BIOPSY Left 10/20/2014    LEFT BREAST EXCISION OF NON HEALING WOUND performed by Katherine Whiting MD at [address] HX BREAST LUMPECTOMY Left 5/28/2014    LEFT, stage 1    HX DILATION AND CURETTAGE      HX OTHER SURGICAL  July 2014    Portacath Insertion    HX VASCULAR ACCESS      portacath in 7/2014, removed 9/2014     No FH of breast or ovarian cancer. Recent imaging-  Bilateral diagnostic mammogram and LEFT breast ultrasound on 7/20/17- BIRADS 2  Bilateral digital diagnostic mammography, interpreted in conjunction with CAD  over-read,  was performed. The breast parenchymal pattern is fatty replaced. Lumpectomy changes are seen in the left breast. No suspicious masses or  microcalcifications are seen. Real-time ultrasonography of the left breast demonstrates a small echogenic  focus at the region of palpable abnormality which likely represents a small area  of fat necrosis. IMPRESSION:  1. BI-RADS category 2, benign. The patient was instructed to follow the palpable  abnormality clinically and to return if this area became larger. 2. The patient should return in one year for routine bilateral mammography. 3. She was informed.     ROS    Physical Exam Constitutional: She appears well-developed and well-nourished. Pulmonary/Chest:       Musculoskeletal:   In a wheelchair and has limited mobility   Skin: Skin is warm, dry and intact. On breasts although patient has skin breakdown to abdomen and buttocks     Visit Vitals    /80    Pulse 81    Ht 5' 1\" (1.549 m)    Wt (!) 371 lb (168.3 kg)    BMI 70.1 kg/m2     ASSESSMENT and PLAN  Encounter Diagnoses   Name Primary?  Breast mass, left Yes    HX: breast cancer      Left breast mass - feels like fat necrosis on exam and was previously thought to be that on imaging. Patient feels as if the area is enlarging. Patient will have a follow-up US at [facility] [place] as this was initially seen there. Discussed the process of fat necrosis and risk of local recurrence. Patient will follow-up at imaging when possible as she has numerous other health conditions she is addressing.

## 2017-11-03 NOTE — PROGRESS NOTES
HISTORY OF PRESENT ILLNESS  Bertin Mcallister is a 65 y.o. female. HPI   ESTABLISHED patient here today for LEFT breast knot. She has palpated a knot in LEFT breast and was even seen on imaging in 7/2017. States the knot has increased in size. Denies any breast pain, nipple discharge/retraction or skin changes. History of breast cancer-  5/2014- LEFT lumpectomy, 2cm Infiltrating ductal carcinoma, grade 3, 0/12 nodes, %. IN 30% Ki 30% (DICS, %. IN 50%), Her 2 neg  Completed chemotherapy-TC  12/2014- completed radiation. Followed by Dr. Jeremías Smith. Currently taking Anastrazole. Followed by Dr. Sugey Junior. No FH of breast or ovarian cancer. Recent imaging-  Bilateral diagnostic mammogram and LEFT breast ultrasound on 7/20/17- BIRADS 2  Bilateral digital diagnostic mammography, interpreted in conjunction with CAD  over-read,  was performed. The breast parenchymal pattern is fatty replaced. Lumpectomy changes are seen in the left breast. No suspicious masses or  microcalcifications are seen. Real-time ultrasonography of the left breast demonstrates a small echogenic  focus at the region of palpable abnormality which likely represents a small area  of fat necrosis. IMPRESSION:  1. BI-RADS category 2, benign. The patient was instructed to follow the palpable  abnormality clinically and to return if this area became larger. 2. The patient should return in one year for routine bilateral mammography. 3. She was informed.     [facility]    Physical Exam    ASSESSMENT and PLAN  {ASSESSMENT/PLAN:[id number]}

## 2017-11-10 ENCOUNTER — OP HISTORICAL/CONVERTED ENCOUNTER (OUTPATIENT)
Dept: OTHER | Age: 62
End: 2017-11-10

## 2017-11-14 ENCOUNTER — HOSPITAL ENCOUNTER (OUTPATIENT)
Dept: MAMMOGRAPHY | Age: 62
Discharge: HOME | End: 2017-11-14
Attending: NURSE PRACTITIONER
Payer: MEDICAID

## 2017-11-14 DIAGNOSIS — N63.20 BREAST MASS, LEFT: ICD-10-CM

## 2017-11-14 DIAGNOSIS — N63.20 LUMP OF BREAST, LEFT: ICD-10-CM

## 2017-11-14 DIAGNOSIS — N63.20 LUMP OF BREAST, LEFT: Primary | ICD-10-CM

## 2017-11-14 PROCEDURE — 76642 ULTRASOUND BREAST LIMITED: CPT

## 2017-11-14 PROCEDURE — 77065 DX MAMMO INCL CAD UNI: CPT

## 2017-12-01 ENCOUNTER — OP HISTORICAL/CONVERTED ENCOUNTER (OUTPATIENT)
Dept: OTHER | Age: 62
End: 2017-12-01

## 2017-12-03 RX ORDER — ANASTROZOLE 1 MG/1
TABLET ORAL
Qty: 30 TAB | Refills: 11 | Status: SHIPPED | OUTPATIENT
Start: 2017-12-03 | End: 2018-11-07 | Stop reason: SDUPTHER

## 2017-12-15 ENCOUNTER — OP HISTORICAL/CONVERTED ENCOUNTER (OUTPATIENT)
Dept: OTHER | Age: 62
End: 2017-12-15

## 2018-01-12 ENCOUNTER — OP HISTORICAL/CONVERTED ENCOUNTER (OUTPATIENT)
Dept: OTHER | Age: 63
End: 2018-01-12

## 2018-02-02 ENCOUNTER — OP HISTORICAL/CONVERTED ENCOUNTER (OUTPATIENT)
Dept: OTHER | Age: 63
End: 2018-02-02

## 2018-02-12 ENCOUNTER — OP HISTORICAL/CONVERTED ENCOUNTER (OUTPATIENT)
Dept: OTHER | Age: 63
End: 2018-02-12

## 2018-03-09 ENCOUNTER — OP HISTORICAL/CONVERTED ENCOUNTER (OUTPATIENT)
Dept: OTHER | Age: 63
End: 2018-03-09

## 2018-03-23 ENCOUNTER — OP HISTORICAL/CONVERTED ENCOUNTER (OUTPATIENT)
Dept: OTHER | Age: 63
End: 2018-03-23

## 2018-03-27 ENCOUNTER — OFFICE VISIT (OUTPATIENT)
Dept: SURGERY | Age: 63
End: 2018-03-27

## 2018-03-27 ENCOUNTER — DOCUMENTATION ONLY (OUTPATIENT)
Dept: SURGERY | Age: 63
End: 2018-03-27

## 2018-03-27 VITALS
DIASTOLIC BLOOD PRESSURE: 64 MMHG | HEIGHT: 61 IN | BODY MASS INDEX: 55.32 KG/M2 | WEIGHT: 293 LBS | SYSTOLIC BLOOD PRESSURE: 124 MMHG | HEART RATE: 90 BPM

## 2018-03-27 DIAGNOSIS — N63.20 LEFT BREAST LUMP: Primary | ICD-10-CM

## 2018-03-27 DIAGNOSIS — R92.8 ABNORMAL ULTRASOUND OF BREAST: ICD-10-CM

## 2018-03-27 RX ORDER — CIPROFLOXACIN 500 MG/1
TABLET ORAL 2 TIMES DAILY
COMMUNITY
End: 2018-11-07 | Stop reason: ALTCHOICE

## 2018-03-27 RX ORDER — HYDROMORPHONE HYDROCHLORIDE 2 MG/1
TABLET ORAL
COMMUNITY
End: 2018-11-07 | Stop reason: ALTCHOICE

## 2018-03-27 NOTE — PROGRESS NOTES
I called [facility] to schedule a  for the breast biopsy and received a confirmation number [id number] and they are aware it is stat.

## 2018-03-27 NOTE — MR AVS SNAPSHOT
[address] [facility] [address] [facility] 
[phone] Patient: Garo Joyner MRN: [id number] NDK:[id number] Visit Information Date & Time Provider Department Dept. Phone Encounter #  
 3/27/2018 10:00 AM Luz Choudhury [address] [facility] [phone] [id number] Your Appointments 4/17/2018 10:00 AM  
ESTABLISHED PATIENT with Brissa Wolfe MD  
[facility] at [address] [place] [address] [facility]) Appt Note: R[place]in, breast AI fu.  
 [address] [facility], [address] [facility] [id number]  
[phone]  
  
   
 [address] [facility], [address] [place] [address] Upcoming Health Maintenance Date Due Hepatitis C Screening 1955 DTaP/Tdap/Td series (1 - Tdap) 9/4/1977 FOBT Q 1 YEAR AGE 50-75 9/4/2006 Pneumococcal 19-64 Highest Risk (2 of 3 - PCV13) 7/31/2013 Influenza Age 5 to Adult 8/1/2017 BREAST CANCER SCRN MAMMOGRAM 11/14/2018 PAP AKA CERVICAL CYTOLOGY 1/16/2020 Allergies as of 3/27/2018  Review Complete On: 3/27/2018 By: Luz Choudhury MD  
 No Known Allergies Current Immunizations  Reviewed on 9/19/2014 Name Date Influenza Vaccine 3/12/2014 Pneumococcal Vaccine (Unspecified Type) 7/31/2012 Zoster Vaccine, Live 3/12/2014 Not reviewed this visit You Were Diagnosed With   
  
 Codes Comments Left breast lump    -  Primary ICD-10-CM: M44.15 ICD-9-CM: 611.72 Abnormal ultrasound of breast     ICD-10-CM: R92.8 ICD-9-CM: 793.89 Vitals BP Pulse Height(growth percentile) Weight(growth percentile) BMI OB Status 124/64 90 5' 1\" (1.549 m) 337 lb (152.9 kg) 63.68 kg/m2 Postmenopausal  
 Smoking Status Former Smoker BMI and BSA Data Body Mass Index Body Surface Area  
 63.68 kg/m 2 2.57 m 2 Preferred Pharmacy Pharmacy Name Phone [address] [facility] [place] [facility], [address] [phone] Your Updated Medication List  
  
   
This list is accurate as of 3/27/18  3:53 PM.  Always use your most recent med list.  
  
  
  
  
 acetaminophen 500 mg tablet Commonly known as:  TYLENOL Take 1,000 mg by mouth three (3) times daily. anastrozole 1 mg tablet Commonly known as:  ARIMIDEX TAKE ONE TABLET BY MOUTH ONCE DAILY CALCIUM 600 PO Take  by mouth. CARAFATE 100 mg/mL suspension Generic drug:  sucralfate Take  by mouth four (4) times daily. carvedilol 3.125 mg tablet Commonly known as:  Idamay Colton Take  by mouth two (2) times daily (with meals). ciprofloxacin HCl 500 mg tablet Commonly known as:  CIPRO Take  by mouth two (2) times a day. diazePAM 5 mg tablet Commonly known as:  VALIUM Take 5 mg by mouth daily. famotidine 20 mg tablet Commonly known as:  PEPCID Take 20 mg by mouth two (2) times a day. furosemide 40 mg tablet Commonly known as:  LASIX Take  by mouth two (2) times a day. GLUCOPHAGE 850 mg tablet Generic drug:  metFORMIN Take  by mouth two (2) times daily (with meals). GLUCOSAMINE 1500 COMPLEX PO Take  by mouth. HYDROmorphone 2 mg tablet Commonly known as:  DILAUDID Take  by mouth every four (4) hours as needed for Pain.  
  
 ketoconazole 2 % topical cream  
Commonly known as:  NIZORAL Apply  to affected area daily. LIDOCAINE VISCOUS 2 % solution Generic drug:  lidocaine Take 15 mL by mouth as needed for Pain. LIPITOR 20 mg tablet Generic drug:  atorvastatin Take  by mouth daily. lisinopril 5 mg tablet Commonly known as:  Leeland Nutley Take  by mouth daily. naproxen 375 mg tablet Commonly known as:  NAPROSYN Take 375 mg by mouth two (2) times daily (with meals). NASONEX 50 mcg/actuation nasal spray Generic drug:  mometasone 2 Sprays daily. NYSTOP powder Generic drug:  nystatin Apply  to affected area as needed. oxyCODONE-acetaminophen  mg per tablet Commonly known as:  PERCOCET 10 Take  by mouth every six (6) hours as needed for Pain.  
  
 pantoprazole 20 mg tablet Commonly known as:  PROTONIX Take 1 Tab by mouth two (2) times a day. potassium chloride 20 mEq tablet Commonly known as:  K-DUR, KLOR-CON Take  by mouth two (2) times a day. rOPINIRole 2 mg tablet Commonly known as:  Pearlene Dami Take  by mouth nightly. sertraline 100 mg tablet Commonly known as:  ZOLOFT Take 2 Tabs by mouth daily. SILVADENE 1 % topical cream  
Generic drug:  silver sulfADIAZINE Apply  to affected area daily. SYNTHROID 100 mcg tablet Generic drug:  levothyroxine Take 125 mcg by mouth Daily (before breakfast). tolterodine ER 4 mg ER capsule Commonly known as:  Glendell Cancer Take 4 mg by mouth daily. TYLENOL COLD AND FLU SEVERE 5--200 mg Tab Generic drug:  rfddaipps-au-mnxvigmy-guaifen Take  by mouth. VENTOLIN HFA 90 mcg/actuation inhaler Generic drug:  albuterol Take  by inhalation as needed. ZyrTEC 10 mg tablet Generic drug:  cetirizine Take  by mouth daily. We Performed the Following SURGICAL PATHOLOGY [AGS7139 Custom] Patient Instructions Breast Lumps: Care Instructions Your Care Instructions Breast lumps are common, especially in women between ages 27 and 48. Many women's breasts feel lumpy and tender before their menstrual period. Women also may have lumps when they are breastfeeding. Breast lumps may go away after menopause. All new breast lumps in women after menopause should be checked by a doctor. Although lumps may be normal for you, it is important to have your doctor check any lump or thickness that is not like the rest of your breast to make sure it is not cancer.  A lump may be larger, harder, or different from the rest of your breast tissue. Follow-up care is a key part of your treatment and safety. Be sure to make and go to all appointments, and call your doctor if you are having problems. It's also a good idea to know your test results and keep a list of the medicines you take. How can you care for yourself at home? · Make an appointment to have a mammogram and other follow-up visits as recommended by your doctor. When should you call for help? Watch closely for changes in your health, and be sure to contact your doctor if: 
· You do not get better as expected. · Your breast has changed. · You have pain in your breast. 
· You have a discharge from your nipple. · A breast lump changes or does not go away. Where can you learn more? Go to http://zane-hannah.info/. Enter Y503 in the search box to learn more about \"Breast Lumps: Care Instructions. \" Current as of: October 13, 2016 Content Version: 11.4 © 7784-2736 [employer]. Care instructions adapted under license by [employer] (which disclaims liability or warranty for this information). If you have questions about a medical condition or this instruction, always ask your healthcare professional. [address] any warranty or liability for your use of this information. Introducing [facility]! Dear Felicitas Travis: 
Thank you for requesting a [facility] account. Our records indicate that you already have an active [facility] account. You can access your account anytime at [email or web address] Did you know that you can access your hospital and ER discharge instructions at any time in [facility]? You can also review all of your test results from your hospital stay or ER visit. Additional Information If you have questions, please visit the Frequently Asked Questions section of the [facility] website at [email or web address] Remember, [employer] is NOT to be used for urgent needs. For medical emergencies, dial 911. Now available from your iPhone and Android! Please provide this summary of care documentation to your next provider. Your primary care clinician is listed as NONE. If you have any questions after today's visit, please call [phone].

## 2018-03-27 NOTE — PROGRESS NOTES
[facility]  OFFICE PROCEDURE PROGRESS NOTE        Chart reviewed for the following:   I, Dr. Ulysses Gallus, have reviewed the History, Physical and updated the Allergic reactions for [address] performed immediately prior to start of procedure:   I, Dr. Ulysses Gallus, have performed the following reviews on Shayy Saluda prior to the start of the procedure:            * Patient was identified by name and date of birth   * Agreement on procedure being performed was verified  * Risks and Benefits explained to the patient  * Procedure site verified and marked as necessary  * Patient was positioned for comfort  * Consent was signed and verified     Time: 1100am      Date of procedure: 3/27/2018    Procedure performed by:  Dr. Ulysses Gallus    Provider assisted by: Charleen Medeiros    Patient assisted by: Charleen Medeiros    How tolerated by patient: Patient tolerated the procedure well without complications. Denies pain post biopsy. Post Procedural Pain Scale: 0    Comments: Written and verbal post biopsy instructions reviewed with and given to patient with her understanding.

## 2018-03-27 NOTE — PROGRESS NOTES
HISTORY OF PRESENT ILLNESS  Iona Clark is a 65 y.o. female. HPI ESTABLISHED patient with complaints of a LEFT breast palpable lump that she has had for about 4 months. She has lost 40 pounds and is awaiting gastric sleeve surgery. S/P LEFT lumpectomy 5/2014 and completed chemotherapy and radiation. Continues taking Arimidex and tolerating it well. The patient was here in 11/2017 with the same complaints of a LEFT breast lump and her breast imaging was normal. The patient denies any nipple inversion or discharge. The patient has an indwelling covington catheter due to a neurogenic bladder and currently being treated for a bladder infection with Cipro.  5/2014 LEFT lumpectomy, 2cm Infiltrating ductal carcinoma, grade 3, 0/12 nodes, %. LA 30% Ki 30% (DICS, %. LA 50%), Her 2 neg  TC  XRT 12/2014  Anastrazole    11/2017   Unilateral left digital diagnostic mammography, interpreted in conjunction with  CAD over-read,  was performed. The breast parenchymal pattern is fatty replaced. There is an area of fat necrosis in the left breast. No suspicious masses or  microcalcifications are seen.     Real-time ultrasonography of the palpable abnormality in the left breast  demonstrates the area of fat necrosis but no solid or cystic lesion. IMPRESSION:  1. BI-RADS category 2, benign. 2. The patient should return in the July 2018 for routine bilateral mammography. 3. She was informed  ROS    Physical Exam   Pulmonary/Chest: Right breast exhibits no mass, no nipple discharge, no skin change and no tenderness. Left breast exhibits mass (2 cm hard lobulated mass 1:00 1/3.). Left breast exhibits no nipple discharge, no skin change and no tenderness. Breasts are symmetrical.       Lymphadenopathy:     She has no cervical adenopathy. She has no axillary adenopathy. Right: No supraclavicular adenopathy present. Left: No supraclavicular adenopathy present.      US - Guided Core Biopsy  Indication : Left Breast mass upper outer quadrant. Palpable. Calcified mass on mammogram  Ultrasound Findings: 2.3 cm lobulated mass with dropout  Prep : alcohol. Anesthesia : 1% lidocaine with epinephrine, 10 cc. Device : The hand-held 10 gauge BARD needle was inserted through the lesion and captured tissue with real-time Ultrasound Confirmation. .   Core Sampling :  2 cores were obtained.  (twice the needle did not penetrated the mass)   Marker:  No clip placed  Dressing : Steristrips, gauze and tape. Instructions : Remove gauze this evening. Remove steristrips in one week. Tolerance: Pt tolerated procedure with no discomfort. Pt was afraid of the pain, but tolerated the procedure with ease. Pathology : Diagnosis:   BREAST, LEFT:   BENIGN BREAST TISSUE WITH FIBROCYSTIC CHANGES. MICROCALCIFICATIONS AND FOCAL CHRONIC INFLAMMATION IDENTIFIED. NO CARCINOMA IDENTIFIED. COMMENT: IMMUNOSTAIN AE1/AE3 AND E-CADHERIN ARE POSITIVE IN   BENIGN DUCTS AND  DEMONSTRATE PLASMA CELLS. IT SUPPORT ABOVE   DIAGNOSIS. Concordance: yes  ASSESSMENT and PLAN    ICD-10-CM ICD-9-CM    1. Left breast lump N63.20 611.72 SURGICAL PATHOLOGY   2. Abnormal ultrasound of breast R92.8 793.89      LEFT breast mass enlarging. Mammogram suggests calcified fat necrosis.    The mass is enlarging and was biopsied today      Called patient  Biopsy is benign  Continue yearly mammograms

## 2018-03-27 NOTE — PATIENT INSTRUCTIONS
Breast Lumps: Care Instructions  Your Care Instructions  Breast lumps are common, especially in women between ages 27 and 48. Many women's breasts feel lumpy and tender before their menstrual period. Women also may have lumps when they are breastfeeding. Breast lumps may go away after menopause. All new breast lumps in women after menopause should be checked by a doctor. Although lumps may be normal for you, it is important to have your doctor check any lump or thickness that is not like the rest of your breast to make sure it is not cancer. A lump may be larger, harder, or different from the rest of your breast tissue. Follow-up care is a key part of your treatment and safety. Be sure to make and go to all appointments, and call your doctor if you are having problems. It's also a good idea to know your test results and keep a list of the medicines you take. How can you care for yourself at home? · Make an appointment to have a mammogram and other follow-up visits as recommended by your doctor. When should you call for help? Watch closely for changes in your health, and be sure to contact your doctor if:  · You do not get better as expected. · Your breast has changed. · You have pain in your breast.  · You have a discharge from your nipple. · A breast lump changes or does not go away. Where can you learn more? Go to http://zane-hannah.info/. Enter P586 in the search box to learn more about \"Breast Lumps: Care Instructions. \"  Current as of: October 13, 2016  Content Version: 11.4  © 2877-9755 [employer]. Care instructions adapted under license by [employer] (which disclaims liability or warranty for this information). If you have questions about a medical condition or this instruction, always ask your healthcare professional. [address] any warranty or liability for your use of this information.

## 2018-03-27 NOTE — COMMUNICATION BODY
HISTORY OF PRESENT ILLNESS  Garo Joyner is a 65 y.o. female. HPI ESTABLISHED patient with complaints of a LEFT breast palpable lump that she has had for about 4 months. She has lost 40 pounds and is awaiting gastric sleeve surgery. S/P LEFT lumpectomy 5/2014 and completed chemotherapy and radiation. Continues taking Arimidex and tolerating it well. The patient was here in 11/2017 with the same complaints of a LEFT breast lump and her breast imaging was normal. The patient denies any nipple inversion or discharge. The patient has an indwelling covington catheter due to a neurogenic bladder and currently being treated for a bladder infection with Cipro.  5/2014 LEFT lumpectomy, 2cm Infiltrating ductal carcinoma, grade 3, 0/12 nodes, %. VT 30% Ki 30% (DICS, %. VT 50%), Her 2 neg  TC  XRT 12/2014  Anastrazole    11/2017   Unilateral left digital diagnostic mammography, interpreted in conjunction with  CAD over-read,  was performed. The breast parenchymal pattern is fatty replaced. There is an area of fat necrosis in the left breast. No suspicious masses or  microcalcifications are seen.     Real-time ultrasonography of the palpable abnormality in the left breast  demonstrates the area of fat necrosis but no solid or cystic lesion. IMPRESSION:  1. BI-RADS category 2, benign. 2. The patient should return in the July 2018 for routine bilateral mammography. 3. She was informed  ROS    Physical Exam   Pulmonary/Chest: Right breast exhibits no mass, no nipple discharge, no skin change and no tenderness. Left breast exhibits mass (2 cm hard lobulated mass 1:00 1/3.). Left breast exhibits no nipple discharge, no skin change and no tenderness. Breasts are symmetrical.       Lymphadenopathy:     She has no cervical adenopathy. She has no axillary adenopathy. Right: No supraclavicular adenopathy present. Left: No supraclavicular adenopathy present.      US - Guided Core Biopsy  Indication : Left Breast mass upper outer quadrant. Palpable. Calcified mass on mammogram  Ultrasound Findings: 2.3 cm lobulated mass with dropout  Prep : alcohol. Anesthesia : 1% lidocaine with epinephrine, 10 cc. Device : The hand-held 10 gauge BARD needle was inserted through the lesion and captured tissue with real-time Ultrasound Confirmation. .   Core Sampling :  2 cores were obtained.  (twice the needle did not penetrated the mass)   Marker:  No clip placed  Dressing : Steristrips, gauze and tape. Instructions : Remove gauze this evening. Remove steristrips in one week. Tolerance: Pt tolerated procedure with no discomfort. Pt was afraid of the pain, but tolerated the procedure with ease. Pathology : Diagnosis:   BREAST, LEFT:   BENIGN BREAST TISSUE WITH FIBROCYSTIC CHANGES. MICROCALCIFICATIONS AND FOCAL CHRONIC INFLAMMATION IDENTIFIED. NO CARCINOMA IDENTIFIED. COMMENT: IMMUNOSTAIN AE1/AE3 AND E-CADHERIN ARE POSITIVE IN   BENIGN DUCTS AND  DEMONSTRATE PLASMA CELLS. IT SUPPORT ABOVE   DIAGNOSIS. Concordance: yes  ASSESSMENT and PLAN    ICD-10-CM ICD-9-CM    1. Left breast lump N63.20 611.72 SURGICAL PATHOLOGY   2. Abnormal ultrasound of breast R92.8 793.89      LEFT breast mass enlarging. Mammogram suggests calcified fat necrosis. The mass is enlarging and was biopsied today      Called patient  Biopsy is benign  Continue yearly mammograms  HISTORY OF PRESENT ILLNESS  Freddie Lynn is a 65 y.o. female. HPI ESTABLISHED patient with complaints of a LEFT breast palpable lump that she has had for about 4 months. She has lost 40 pounds and is awaiting gastric sleeve surgery. S/P LEFT lumpectomy 5/2014 and completed chemotherapy and radiation. Continues taking Arimidex and tolerating it well. The patient was here in 11/2017 with the same complaints of a LEFT breast lump and her breast imaging was normal. The patient denies any nipple inversion or discharge.    The patient has an indwelling covington catheter due to a neurogenic bladder and currently being treated for a bladder infection with Cipro.  5/2014 LEFT lumpectomy, 2cm Infiltrating ductal carcinoma, grade 3, 0/12 nodes, %. MO 30% Ki 30% (DICS, %. MO 50%), Her 2 neg  TC  XRT 12/2014  Anastrazole    11/2017   Unilateral left digital diagnostic mammography, interpreted in conjunction with  CAD over-read,  was performed. The breast parenchymal pattern is fatty replaced. There is an area of fat necrosis in the left breast. No suspicious masses or  microcalcifications are seen.     Real-time ultrasonography of the palpable abnormality in the left breast  demonstrates the area of fat necrosis but no solid or cystic lesion. IMPRESSION:  1. BI-RADS category 2, benign. 2. The patient should return in the July 2018 for routine bilateral mammography. 3. She was informed  ROS    Physical Exam   Pulmonary/Chest: Right breast exhibits no mass, no nipple discharge, no skin change and no tenderness. Left breast exhibits mass (2 cm hard lobulated mass 1:00 1/3.). Left breast exhibits no nipple discharge, no skin change and no tenderness. Breasts are symmetrical.       Lymphadenopathy:     She has no cervical adenopathy. She has no axillary adenopathy. Right: No supraclavicular adenopathy present. Left: No supraclavicular adenopathy present. US - Guided Core Biopsy  Indication : Left Breast mass upper outer quadrant. Palpable. Calcified mass on mammogram  Ultrasound Findings: 2.3 cm lobulated mass with dropout  Prep : alcohol. Anesthesia : 1% lidocaine with epinephrine, 10 cc. Device : The hand-held 10 gauge BARD needle was inserted through the lesion and captured tissue with real-time Ultrasound Confirmation. .   Core Sampling :  2 cores were obtained.  (twice the needle did not penetrated the mass)   Marker:  No clip placed  Dressing : Steristrips, gauze and tape. Instructions : Remove gauze this evening.   Remove steristrips in one week. Tolerance: Pt tolerated procedure with no discomfort. Pt was afraid of the pain, but tolerated the procedure with ease. Pathology : Diagnosis:   BREAST, LEFT:   BENIGN BREAST TISSUE WITH FIBROCYSTIC CHANGES. MICROCALCIFICATIONS AND FOCAL CHRONIC INFLAMMATION IDENTIFIED. NO CARCINOMA IDENTIFIED. COMMENT: IMMUNOSTAIN AE1/AE3 AND E-CADHERIN ARE POSITIVE IN   BENIGN DUCTS AND  DEMONSTRATE PLASMA CELLS. IT SUPPORT ABOVE   DIAGNOSIS. Concordance: yes  ASSESSMENT and PLAN    ICD-10-CM ICD-9-CM    1. Left breast lump N63.20 611.72 SURGICAL PATHOLOGY   2. Abnormal ultrasound of breast R92.8 793.89      LEFT breast mass enlarging. Mammogram suggests calcified fat necrosis.    The mass is enlarging and was biopsied today      Called patient  Biopsy is benign  Continue yearly mammograms

## 2018-03-29 LAB
DX ICD CODE: NORMAL
DX ICD CODE: NORMAL
PATH REPORT.FINAL DX SPEC: NORMAL
PATH REPORT.GROSS SPEC: NORMAL
PATH REPORT.RELEVANT HX SPEC: NORMAL
PATH REPORT.SITE OF ORIGIN SPEC: NORMAL
PATHOLOGIST NAME: NORMAL
PAYMENT PROCEDURE: NORMAL

## 2018-04-17 ENCOUNTER — OFFICE VISIT (OUTPATIENT)
Dept: ONCOLOGY | Age: 63
End: 2018-04-17

## 2018-04-17 PROBLEM — F33.9 RECURRENT DEPRESSION (HCC): Status: ACTIVE | Noted: 2017-04-11

## 2018-04-17 PROBLEM — E66.01 MORBID OBESITY (HCC): Status: ACTIVE | Noted: 2018-04-17

## 2018-04-20 ENCOUNTER — OP HISTORICAL/CONVERTED ENCOUNTER (OUTPATIENT)
Dept: OTHER | Age: 63
End: 2018-04-20

## 2018-05-01 ENCOUNTER — OFFICE VISIT (OUTPATIENT)
Dept: ONCOLOGY | Age: 63
End: 2018-05-01

## 2018-05-01 VITALS
TEMPERATURE: 97.6 F | DIASTOLIC BLOOD PRESSURE: 68 MMHG | SYSTOLIC BLOOD PRESSURE: 115 MMHG | OXYGEN SATURATION: 99 % | RESPIRATION RATE: 20 BRPM | HEART RATE: 98 BPM

## 2018-05-01 DIAGNOSIS — T45.1X5A HOT FLASHES RELATED TO AROMATASE INHIBITOR THERAPY: ICD-10-CM

## 2018-05-01 DIAGNOSIS — F33.9 RECURRENT DEPRESSION (HCC): ICD-10-CM

## 2018-05-01 DIAGNOSIS — R23.2 HOT FLASHES RELATED TO AROMATASE INHIBITOR THERAPY: ICD-10-CM

## 2018-05-01 DIAGNOSIS — E66.01 MORBID OBESITY (HCC): ICD-10-CM

## 2018-05-01 DIAGNOSIS — C50.912 MALIGNANT NEOPLASM OF LEFT BREAST IN FEMALE, ESTROGEN RECEPTOR POSITIVE, UNSPECIFIED SITE OF BREAST (HCC): Primary | ICD-10-CM

## 2018-05-01 DIAGNOSIS — Z78.0 POSTMENOPAUSAL: ICD-10-CM

## 2018-05-01 DIAGNOSIS — Z17.0 MALIGNANT NEOPLASM OF LEFT BREAST IN FEMALE, ESTROGEN RECEPTOR POSITIVE, UNSPECIFIED SITE OF BREAST (HCC): Primary | ICD-10-CM

## 2018-05-01 NOTE — MR AVS SNAPSHOT
[address] 
 
 
 [address] [facility], [address] 1007 [facility] 
[phone] Patient: Jolie Chaidez MRN: [id number] MHQ:[id number] Visit Information Date & Time Provider Department Dept. Phone Encounter #  
 5/1/2018  2:15 PM Lakhwinder Juarez NP [address] at [address] [place] [address] [phone] Follow-up Instructions Return in about 6 months (around 11/1/2018) for [place] - AI f/u. Your Appointments 11/1/2018  1:00 PM  
ESTABLISHED PATIENT with Amy Springer MD  
[facility] at [address] [facility]) Appt Note: 6mo AI f/u, [place] [facility], [phone]  
[phone]  
  
   
 [address] [facility], [address] Upcoming Health Maintenance Date Due Hepatitis C Screening 1955 DTaP/Tdap/Td series (1 - Tdap) 9/4/1977 FOBT Q 1 YEAR AGE 50-75 9/4/2006 Pneumococcal 19-64 Highest Risk (2 of 3 - PCV13) 7/31/2013 Influenza Age 5 to Adult 8/1/2018 BREAST CANCER SCRN MAMMOGRAM 11/14/2018 PAP AKA CERVICAL CYTOLOGY 1/16/2020 Allergies as of 5/1/2018  Review Complete On: 3/27/2018 By: Emily Monroe MD  
 No Known Allergies Current Immunizations  Reviewed on 9/19/2014 Name Date Influenza Vaccine 3/12/2014 Pneumococcal Vaccine (Unspecified Type) 7/31/2012 Zoster Vaccine, Live 3/12/2014 Not reviewed this visit You Were Diagnosed With   
  
 Codes Comments Malignant neoplasm of left breast in female, estrogen receptor positive, unspecified site of breast ([facility] [address]    -  Primary ICD-10-CM: C50.912, Z17.0 ICD-9-CM: 174.9, V86.0 Morbid obesity ([facility] [address]     ICD-10-CM: E66.01 
ICD-9-CM: 278.01 Hot flashes related to aromatase inhibitor therapy     ICD-10-CM: R23.2, T45.1X5A 
ICD-9-CM: 782.62, E933.1 Postmenopausal     ICD-10-CM: Z78.0 ICD-9-CM: V49.81 Vitals BP Pulse Temp Resp SpO2 OB Status 115/68 (BP 1 Location: Right arm, BP Patient Position: Sitting) 98 97.6 °F (36.4 °C) (Temporal) 20 99% Postmenopausal  
 Smoking Status Former Smoker Vitals History Preferred Pharmacy Pharmacy Name Phone Vesna [facility] [address] [facility], [address] [phone] Your Updated Medication List  
  
   
This list is accurate as of 5/1/18  4:38 PM.  Always use your most recent med list.  
  
  
  
  
 acetaminophen 500 mg tablet Commonly known as:  TYLENOL Take 1,000 mg by mouth three (3) times daily. anastrozole 1 mg tablet Commonly known as:  ARIMIDEX TAKE ONE TABLET BY MOUTH ONCE DAILY CALCIUM 600 PO Take  by mouth. CARAFATE 100 mg/mL suspension Generic drug:  sucralfate Take  by mouth four (4) times daily. carvedilol 3.125 mg tablet Commonly known as:  Garcia Hirschfeld Take  by mouth two (2) times daily (with meals). ciprofloxacin HCl 500 mg tablet Commonly known as:  CIPRO Take  by mouth two (2) times a day. diazePAM 5 mg tablet Commonly known as:  VALIUM Take 5 mg by mouth daily. famotidine 20 mg tablet Commonly known as:  PEPCID Take 20 mg by mouth two (2) times a day. furosemide 40 mg tablet Commonly known as:  LASIX Take  by mouth two (2) times a day. GLUCOPHAGE 850 mg tablet Generic drug:  metFORMIN Take  by mouth two (2) times daily (with meals). GLUCOSAMINE 1500 COMPLEX PO Take  by mouth. HYDROmorphone 2 mg tablet Commonly known as:  DILAUDID Take  by mouth every four (4) hours as needed for Pain.  
  
 ketoconazole 2 % topical cream  
Commonly known as:  NIZORAL Apply  to affected area daily. LIDOCAINE VISCOUS 2 % solution Generic drug:  lidocaine Take 15 mL by mouth as needed for Pain. LIPITOR 20 mg tablet Generic drug:  atorvastatin Take  by mouth daily. lisinopril 5 mg tablet Commonly known as:  Therisa Semen Take  by mouth daily. naproxen 375 mg tablet Commonly known as:  NAPROSYN Take 375 mg by mouth two (2) times daily (with meals). NASONEX 50 mcg/actuation nasal spray Generic drug:  mometasone 2 Sprays daily. NYSTOP powder Generic drug:  nystatin Apply  to affected area as needed. oxyCODONE-acetaminophen  mg per tablet Commonly known as:  PERCOCET 10 Take  by mouth every six (6) hours as needed for Pain.  
  
 pantoprazole 20 mg tablet Commonly known as:  PROTONIX Take 1 Tab by mouth two (2) times a day. potassium chloride 20 mEq tablet Commonly known as:  K-DUR, KLOR-CON Take  by mouth two (2) times a day. rOPINIRole 2 mg tablet Commonly known as:  Gladies Bitter Take  by mouth nightly. sertraline 100 mg tablet Commonly known as:  ZOLOFT Take 2 Tabs by mouth daily. SILVADENE 1 % topical cream  
Generic drug:  silver sulfADIAZINE Apply  to affected area daily. SYNTHROID 100 mcg tablet Generic drug:  levothyroxine Take 125 mcg by mouth Daily (before breakfast). tolterodine ER 4 mg ER capsule Commonly known as:  Pro Lot Take 4 mg by mouth daily. TYLENOL COLD AND FLU SEVERE 5--200 mg Tab Generic drug:  osutmgygk-pr-piimdfce-guaifen Take  by mouth. VENTOLIN HFA 90 mcg/actuation inhaler Generic drug:  albuterol Take  by inhalation as needed. ZyrTEC 10 mg tablet Generic drug:  cetirizine Take  by mouth daily. Follow-up Instructions Return in about 6 months (around 11/1/2018) for Lida CACERES f/u. To-Do List   
 Around 07/02/2018 Imaging:  TEODORO MAMMO BI DX INCL CAD Around 11/01/2018 Imaging:  DEXA BONE DENSITY STUDY AXIAL Introducing [facility]! Dear Deon Silver: 
Thank you for requesting a [employer] account. Our records indicate that you already have an active [employer] account.   You can access your account anytime at [email or web address] Did you know that you can access your hospital and ER discharge instructions at any time in [employer]? You can also review all of your test results from your hospital stay or ER visit. Additional Information If you have questions, please visit the Frequently Asked Questions section of the [employer] website at [email or web address] Remember, [employer] is NOT to be used for urgent needs. For medical emergencies, dial 911. Now available from your iPhone and Android! Please provide this summary of care documentation to your next provider. Your primary care clinician is listed as NONE. If you have any questions after today's visit, please call [phone].

## 2018-05-02 NOTE — PROGRESS NOTES
[facility]  [address] 100[address] [facility]  Yamileth Resides: [phone]  F: [phone]      Reason for Visit:   Shanae Rowe is a 65 y.o. female who is seen for follow up of breast cancer. Treatment History:   · Mammogram and ultrasound 2014 at [facility]: BIRADS 5 left breast abnormality, increasing in size compared with exam 6 months prior  · Patient declined preoperative biopsy  · Left lumpectomy and sentinel node dissection 2014 with Dr. Alexsandra Burrell: 2cm infiltrating ductal carcinoma, grade 3, %, ME 30%, HER2/jesusita negative, Ki67 30%, 0/12 nodes. Prosigna score 77 = high risk. · Stage IA (pT1c pN0 Mx) Left Breast Cancer  · Chemotherapy with TC x4 cycles from 7/10/14 to 2014  · Adjuvant radiation with Dr. Isadora Pierce 14 - 14  · Adjuvant endocrine therapy with Anastrozole beginning 1/3/2015     History of Present Illness:   Here today for follow up. She states she is doing pretty well. She has been focusing on losing weight with plans for gastric sleeve surgery in July. She has lost 60 pounds thus far and is now exercising. She reports she can now take a few steps and can better perform ADLs than she has been able to in years. Has indwelling covington, present for the past few months, due to neurogenic bladder and has had frequent UTIs since it was inserted. She continues to take anastrozole, compliant with therapy. Tolerating it well. Hot flashes improved. Seen by Dr. Alexsandra Burrell last month for left breast mass which was biopsied and consistent with fat necrosis. No additional breast complaints. She is accompanied by her  today. PAST HISTORY: The following sections were reviewed and updated in the EMR as appropriate: PMH, SH, FH, Medications, Allergies. She lives with her  in [facility], about an hour from here.   She is confined to a wheelchair/scooter secondary to her morbid obesity and associated arthritis, but is able to get up to transfer. No Known Allergies     Review of Systems: A complete review of systems was obtained, reviewed, and scanned into the EMR. Pertinent findings reviewed above. Physical Exam:     Visit Vitals    /68 (BP 1 Location: Right arm, BP Patient Position: Sitting)    Pulse 98    Temp 97.6 °F (36.4 °C) (Temporal)    Resp 20    SpO2 99%     ECOG PS: 0  General: No distress  Eyes: PERRLA, anicteric sclerae  HENT: Atraumatic, OP clear. Neck: Supple  Lymphatic: No cervical, supraclavicular, or inguinal adenopathy  Respiratory: CTAB, normal respiratory effort  CV: Normal rate and rhythm, no murmurs, 1+ peripheral edema  GI: Soft, morbidly obese, nontender, nondistended, no masses. MS: Sitting in wheelchair. Digits without clubbing or cyanosis. Skin: No ecchymoses, or petechiae. Normal temperature, turgor, and texture. Psych: Alert, oriented, appropriate affect, normal judgment/insight    Results:     Lab Results   Component Value Date/Time    WBC 7.9 09/30/2014 09:10 AM    HGB 11.2 (L) 09/30/2014 09:10 AM    HCT 34.0 (L) 09/30/2014 09:10 AM    PLATELET 936 44/64/4082 09:10 AM    MCV 90.9 09/30/2014 09:10 AM    ABS.  NEUTROPHILS 5.3 09/30/2014 09:10 AM    Hemoglobin (POC) 10.2 (L) 09/19/2014 02:56 PM    Hematocrit (POC) 30 (L) 09/19/2014 02:56 PM     Lab Results   Component Value Date/Time    Sodium 140 05/23/2014 02:40 PM    Potassium 4.4 05/23/2014 02:40 PM    Chloride 102 05/23/2014 02:40 PM    CO2 29 05/23/2014 02:40 PM    Glucose 143 (H) 05/23/2014 02:40 PM    BUN 17 05/23/2014 02:40 PM    Creatinine 0.61 05/23/2014 02:40 PM    GFR est AA >60 05/23/2014 02:40 PM    GFR est non-AA >60 05/23/2014 02:40 PM    Calcium 9.2 05/23/2014 02:40 PM    Sodium (POC) 138 09/19/2014 02:56 PM    Potassium (POC) 4.5 09/19/2014 02:56 PM    Chloride (POC) 102 09/19/2014 02:56 PM    Glucose (POC) 102 09/19/2014 02:56 PM    BUN (POC) 19 09/19/2014 02:56 PM    Creatinine (POC) 1.1 09/19/2014 02:56 PM    Calcium, ionized (POC) 1.17 09/19/2014 02:56 PM     Lab Results   Component Value Date/Time    Bilirubin, total 0.4 05/23/2014 02:40 PM    ALT (SGPT) 38 05/23/2014 02:40 PM    AST (SGOT) 22 05/23/2014 02:40 PM    Alk. phosphatase 91 05/23/2014 02:40 PM    Protein, total 7.1 05/23/2014 02:40 PM    Albumin 3.4 (L) 05/23/2014 02:40 PM    Globulin 3.7 05/23/2014 02:40 PM       DEXA 11/11/2014: normal   DEXA 11/03/2016: normal    Mammogram 7/07/2015: benign  Mammogram 6/28/2016: benign  Bilateral mammogram and left breast US 7/20/2017: benign    Left mammogram and U/S breast 11/14/2017: The breast parenchymal pattern is fatty replaced. There is an area of fat necrosis in the left breast. No suspicious masses or microcalcifications are seen. Real-time ultrasonography of the palpable abnormality in the left breast demonstrates the area of fat necrosis but no solid or cystic lesion. BI-RADS category 2, benign    Breast biopsy 3/27/2017 by Dr. Sonal Franklin: benign breast tissue with fibrocystic changes. microcalcifications and focal chronic inflammation identified. Assessment:   1) Breast Cancer, Left  Stage IA, ER/AL positive, HER2 negative, high risk prosigna score  She is s/p resection followed by adjuvant chemotherapy with TC x 4 and adjuvant radiation. She is now on adjuvant endocrine therapy with anastrozole, with plans for 5 years of therapy. She continues tolerating therapy well. She has no evidence of disease recurrence at this time. We will continue with therapy and active surveillance. We will plan to see her back in 6 months. 2) Bone Health  Increased risk of osteoporosis while on AI therapy. Continue calcium and vitamin D. Last DEXA normal, repeat every 2 years while on AI, due 11/2018 and ordered today. 3) Depression  Persists. Continues to follow with psychiatry. 4) Hot flashes  Improved. Likely related to AI. Monitor for now.     5) Left breast lump  Evaluated by surgery and biopsied-benign. 6) Obesity  I commended her on her weight loss thus far and her improvement in independence already. I recommend she proceed with surgery as planned. No contraindications from an oncology perspective. Plan:     · Continue anastrazole 1mg PO daily through 1/2020  · Continue Calcium, Vitamin D  · Mammogram yearly, due 7/2018 at time of surgery appt  · DEXA due 11/2018, ordered today  · Follow up with surgery as planned  · Return to clinic in 6 months, or sooner if needed    Patient was seen in conjunction with Chris Oropeza NP.       Signed By: Chapito Duran MD

## 2018-07-06 ENCOUNTER — OP HISTORICAL/CONVERTED ENCOUNTER (OUTPATIENT)
Dept: OTHER | Age: 63
End: 2018-07-06

## 2018-07-20 ENCOUNTER — OP HISTORICAL/CONVERTED ENCOUNTER (OUTPATIENT)
Dept: OTHER | Age: 63
End: 2018-07-20

## 2018-08-03 ENCOUNTER — OP HISTORICAL/CONVERTED ENCOUNTER (OUTPATIENT)
Dept: OTHER | Age: 63
End: 2018-08-03

## 2018-08-17 ENCOUNTER — OP HISTORICAL/CONVERTED ENCOUNTER (OUTPATIENT)
Dept: OTHER | Age: 63
End: 2018-08-17

## 2018-09-07 ENCOUNTER — OP HISTORICAL/CONVERTED ENCOUNTER (OUTPATIENT)
Dept: OTHER | Age: 63
End: 2018-09-07

## 2018-09-21 ENCOUNTER — OP HISTORICAL/CONVERTED ENCOUNTER (OUTPATIENT)
Dept: OTHER | Age: 63
End: 2018-09-21

## 2018-10-12 ENCOUNTER — OP HISTORICAL/CONVERTED ENCOUNTER (OUTPATIENT)
Dept: OTHER | Age: 63
End: 2018-10-12

## 2018-11-02 ENCOUNTER — OP HISTORICAL/CONVERTED ENCOUNTER (OUTPATIENT)
Dept: OTHER | Age: 63
End: 2018-11-02

## 2018-11-02 NOTE — PROGRESS NOTES
[facility] [place] at [facility]  [address] [facility]  Blanche Casillasonds: [phone]  F: [phone]      Reason for Visit:   River Alvarado is a 59 y.o. female who is seen for follow up of breast cancer. Treatment History:   · Mammogram and ultrasound 4/25/2014 at [facility]: BIRADS 5 left breast abnormality, increasing in size compared with exam 6 months prior  · Patient declined preoperative biopsy  · Left lumpectomy and sentinel node dissection 5/28/2014 with Dr. Nicolasa Prakash: 2cm infiltrating ductal carcinoma, grade 3, %, AR 30%, HER2/jesusita negative, Ki67 30%, 0/12 nodes. Prosigna score 77 = high risk. · Stage IA (pT1c pN0 Mx) Left Breast Cancer  · Chemotherapy with TC x4 cycles from 7/10/14 to 9/11/2014  · Adjuvant radiation with Dr. Scarlett Hector 11/12/14 - 12/17/14  · Adjuvant endocrine therapy with Anastrozole beginning 1/3/2015     History of Present Illness:   She continues to work on weight loss. She continues with some wound issues and bladder issues. She is hoping to have weight loss surgery, but is still working through the process for this. Left hand remains numb. She notes a new left breast lump since last here. Occasionally causes pain. She also thinks the previous breast lump which was biopsied is getting larger. She remains on anastrozole, tolerating this well. Hot flashes minimal.    She is accompanied by her  today. She lives with her  in [facility], about an hour from here. She is confined to a wheelchair/scooter secondary to her morbid obesity and associated arthritis, but is able to get up to transfer. PAST HISTORY: The following sections were reviewed and updated in the EMR as appropriate: PMH, SH, FH, Medications, Allergies. No Known Allergies     Review of Systems: A complete review of systems was obtained, reviewed, and scanned into the EMR. Pertinent findings reviewed above.       Physical Exam:     Visit Vitals  /76 (BP 1 Location: Right arm, BP Patient Position: Sitting)   Pulse 84   Temp 96.6 °F (35.9 °C) (Oral)   Resp 20   Ht 5' 1\" (1.549 m)   SpO2 95%   BMI 63.68 kg/m²     ECOG PS: 0  General: No distress  Eyes: PERRLA, anicteric sclerae  HENT: Atraumatic, OP clear. Neck: Supple  Lymphatic: No cervical, supraclavicular, or inguinal adenopathy  Respiratory: CTAB, normal respiratory effort  CV: Normal rate and rhythm, no murmurs, 1+ peripheral edema  GI: Soft, morbidly obese, nontender, nondistended, no masses. MS: Sitting in wheelchair. Digits without clubbing or cyanosis. Skin: No ecchymoses, or petechiae. Normal temperature, turgor, and texture. Psych: Alert, oriented, appropriate affect, normal judgment/insight    Results:     Lab Results   Component Value Date/Time    WBC 7.9 09/30/2014 09:10 AM    HGB 11.2 (L) 09/30/2014 09:10 AM    HCT 34.0 (L) 09/30/2014 09:10 AM    PLATELET 666 31/27/6840 09:10 AM    MCV 90.9 09/30/2014 09:10 AM    ABS.  NEUTROPHILS 5.3 09/30/2014 09:10 AM    Hemoglobin (POC) 10.2 (L) 09/19/2014 02:56 PM    Hematocrit (POC) 30 (L) 09/19/2014 02:56 PM     Lab Results   Component Value Date/Time    Sodium 140 05/23/2014 02:40 PM    Potassium 4.4 05/23/2014 02:40 PM    Chloride 102 05/23/2014 02:40 PM    CO2 29 05/23/2014 02:40 PM    Glucose 143 (H) 05/23/2014 02:40 PM    BUN 17 05/23/2014 02:40 PM    Creatinine 0.61 05/23/2014 02:40 PM    GFR est AA >60 05/23/2014 02:40 PM    GFR est non-AA >60 05/23/2014 02:40 PM    Calcium 9.2 05/23/2014 02:40 PM    Sodium (POC) 138 09/19/2014 02:56 PM    Potassium (POC) 4.5 09/19/2014 02:56 PM    Chloride (POC) 102 09/19/2014 02:56 PM    Glucose (POC) 102 09/19/2014 02:56 PM    BUN (POC) 19 09/19/2014 02:56 PM    Creatinine (POC) 1.1 09/19/2014 02:56 PM    Calcium, ionized (POC) 1.17 09/19/2014 02:56 PM     Lab Results   Component Value Date/Time    Bilirubin, total 0.4 05/23/2014 02:40 PM    ALT (SGPT) 38 05/23/2014 02:40 PM    AST (SGOT) 22 05/23/2014 02:40 PM    Alk. phosphatase 91 05/23/2014 02:40 PM    Protein, total 7.1 05/23/2014 02:40 PM    Albumin 3.4 (L) 05/23/2014 02:40 PM    Globulin 3.7 05/23/2014 02:40 PM       DEXA 11/11/2014: normal   DEXA 11/03/2016: normal    Mammogram 7/07/2015: benign  Mammogram 6/28/2016: benign  Bilateral mammogram and left breast US 7/20/2017: benign    Left mammogram and U/S breast 11/14/2017: The breast parenchymal pattern is fatty replaced. There is an area of fat necrosis in the left breast. No suspicious masses or microcalcifications are seen. Real-time ultrasonography of the palpable abnormality in the left breast demonstrates the area of fat necrosis but no solid or cystic lesion. BI-RADS category 2, benign    Breast biopsy 3/27/2017 by Dr. Ltaoya Morales: benign breast tissue with fibrocystic changes. microcalcifications and focal chronic inflammation identified. Assessment:   1) Breast Cancer, Left  Stage IA, ER/ND positive, HER2 negative, high risk prosigna score  She is s/p resection followed by adjuvant chemotherapy with TC x 4 and adjuvant radiation. She is now on adjuvant endocrine therapy with anastrozole, with plans for 5 years of therapy. She continues tolerating therapy well. She does note a new breast lump that she is concerned about, and the previous lesion that was biopsied (fat necrosis) seems to be getting bigger. Mammogram scheduled for next week. I suggested she follow up with breast surgery after this, in case an additional biopsy is needed. We will continue with therapy and active surveillance. We will plan to see her back in 6 months, or sooner pending results of upcoming mammogram.    Continue yearly mammogram.    2) Bone Health  Increased risk of osteoporosis while on AI therapy. Continue calcium and vitamin D. Last DEXA normal, repeat every 2 years while on AI, due 11/2018, scheduled. 3) Depression  Persists. Continues to follow with psychiatry.     4) Hot flashes  Improved. Likely related to AI. Monitor for now. 5) Left breast lump  Evaluated by surgery and biopsied-benign. 6) Obesity  She is working on this. She is hoping to have weight loss surgery. No contraindications from an oncology perspective.     Plan:     · Continue anastrazole 1mg PO daily through 1/2020  · Continue Calcium, Vitamin D  · Mammogram yearly, scheduled for 11/2018  · DEXA every 2 years, scheduled for 11/2018  · Return to clinic in 6 months, or sooner if needed      Signed By: Darrion Aguayo MD

## 2018-11-07 ENCOUNTER — OFFICE VISIT (OUTPATIENT)
Dept: ONCOLOGY | Age: 63
End: 2018-11-07

## 2018-11-07 VITALS
DIASTOLIC BLOOD PRESSURE: 76 MMHG | SYSTOLIC BLOOD PRESSURE: 121 MMHG | RESPIRATION RATE: 20 BRPM | OXYGEN SATURATION: 95 % | HEART RATE: 84 BPM | HEIGHT: 61 IN | TEMPERATURE: 96.6 F | BODY MASS INDEX: 63.68 KG/M2

## 2018-11-07 DIAGNOSIS — C50.912 MALIGNANT NEOPLASM OF LEFT BREAST IN FEMALE, ESTROGEN RECEPTOR POSITIVE, UNSPECIFIED SITE OF BREAST (HCC): Primary | ICD-10-CM

## 2018-11-07 DIAGNOSIS — Z17.0 MALIGNANT NEOPLASM OF LEFT BREAST IN FEMALE, ESTROGEN RECEPTOR POSITIVE, UNSPECIFIED SITE OF BREAST (HCC): Primary | ICD-10-CM

## 2018-11-07 RX ORDER — ANASTROZOLE 1 MG/1
1 TABLET ORAL DAILY
Qty: 90 TAB | Refills: 3 | Status: SHIPPED | OUTPATIENT
Start: 2018-11-07 | End: 2019-12-02 | Stop reason: SDUPTHER

## 2018-11-20 ENCOUNTER — HOSPITAL ENCOUNTER (OUTPATIENT)
Dept: MAMMOGRAPHY | Age: 63
Discharge: HOME | End: 2018-11-20
Attending: NURSE PRACTITIONER
Payer: MEDICAID

## 2018-11-20 ENCOUNTER — OFFICE VISIT (OUTPATIENT)
Dept: SURGERY | Age: 63
End: 2018-11-20

## 2018-11-20 VITALS
HEIGHT: 61 IN | HEART RATE: 88 BPM | BODY MASS INDEX: 55.32 KG/M2 | DIASTOLIC BLOOD PRESSURE: 50 MMHG | SYSTOLIC BLOOD PRESSURE: 90 MMHG | WEIGHT: 293 LBS

## 2018-11-20 DIAGNOSIS — Z17.0 MALIGNANT NEOPLASM OF LEFT BREAST IN FEMALE, ESTROGEN RECEPTOR POSITIVE, UNSPECIFIED SITE OF BREAST (HCC): ICD-10-CM

## 2018-11-20 DIAGNOSIS — C50.912 MALIGNANT NEOPLASM OF LEFT BREAST IN FEMALE, ESTROGEN RECEPTOR POSITIVE, UNSPECIFIED SITE OF BREAST (HCC): ICD-10-CM

## 2018-11-20 DIAGNOSIS — Z85.3 HX: BREAST CANCER: ICD-10-CM

## 2018-11-20 DIAGNOSIS — Z78.0 POSTMENOPAUSAL: ICD-10-CM

## 2018-11-20 DIAGNOSIS — N63.20 BREAST MASS, LEFT: Primary | ICD-10-CM

## 2018-11-20 PROCEDURE — 77066 DX MAMMO INCL CAD BI: CPT

## 2018-11-20 PROCEDURE — 77080 DXA BONE DENSITY AXIAL: CPT

## 2018-11-20 PROCEDURE — 76642 ULTRASOUND BREAST LIMITED: CPT

## 2018-11-20 RX ORDER — BUPROPION HYDROCHLORIDE 150 MG/1
TABLET, EXTENDED RELEASE ORAL
COMMUNITY
Start: 2018-11-09

## 2018-11-20 RX ORDER — LORATADINE 10 MG/1
TABLET ORAL
COMMUNITY
Start: 2018-11-09 | End: 2020-09-21

## 2018-11-20 RX ORDER — NAPROXEN 500 MG/1
TABLET ORAL
COMMUNITY
Start: 2018-11-20 | End: 2020-09-14

## 2018-11-20 RX ORDER — PANTOPRAZOLE SODIUM 40 MG/1
40 TABLET, DELAYED RELEASE ORAL DAILY
COMMUNITY
Start: 2018-11-09

## 2018-11-20 RX ORDER — LEVOTHYROXINE SODIUM 200 UG/1
TABLET ORAL
COMMUNITY
Start: 2018-11-09 | End: 2018-11-20 | Stop reason: ALTCHOICE

## 2018-11-20 RX ORDER — ROPINIROLE 3 MG/1
TABLET, FILM COATED ORAL
COMMUNITY
Start: 2018-11-09 | End: 2020-10-14 | Stop reason: ALTCHOICE

## 2018-11-20 RX ORDER — OMEPRAZOLE 20 MG/1
CAPSULE, DELAYED RELEASE ORAL
COMMUNITY
Start: 2018-11-10 | End: 2018-11-20 | Stop reason: ALTCHOICE

## 2018-11-20 RX ORDER — MIRABEGRON 50 MG/1
TABLET, FILM COATED, EXTENDED RELEASE ORAL
COMMUNITY
Start: 2018-11-09

## 2018-11-20 RX ORDER — HYDROXYZINE PAMOATE 25 MG/1
CAPSULE ORAL
COMMUNITY
Start: 2018-11-19 | End: 2020-02-25

## 2018-11-20 NOTE — PROGRESS NOTES
HISTORY OF PRESENT ILLNESS  Chan Fu is a 59 y.o. female. HPI ESTABLISHED patient here today due to a LEFT breast lump that has gotten larger. This breast lump was biopsied in 3/2018 and was benign. She had a mammogram and ultrasound today that were both normal. The patient denies any nipple inversion or discharge. The patient's BP was low. She will follow up with her PCP to review her medications. She recently lost 80 lbs.  5/2014 LEFT lumpectomy, 2cm Infiltrating ductal carcinoma, grade 3, 0/12 nodes, %. KY 30% Ki 30% (DICS, %. KY 50%), Her 2 neg  TC  XRT 12/2014  Anastrazole  Mammogram BI DX and LEFT ultrasound - 11/20/2018  Real-time ultrasonography of the left breast in the region of palpable  abnormality at 3:00 demonstrates the fat necrosis calcifications, corresponding  to the enlarging palpable abnormality. In the second area of palpable  abnormality in the outer breasts no solid or cystic lesion was seen.     IMPRESSION  IMPRESSION:  1. BI-RADS category 2, benign. 2. The patient should return in one year for routine bilateral mammography. 3. She was informed  ROS    Physical Exam   Pulmonary/Chest: Right breast exhibits no mass, no nipple discharge, no skin change and no tenderness. Left breast exhibits mass (1 cm hard round mobile mass 3:00 1/3.  nodular tissue deep to the lumpectomy scar). Left breast exhibits no nipple discharge, no skin change and no tenderness. Breasts are symmetrical.       Lymphadenopathy:     She has no cervical adenopathy. She has no axillary adenopathy. Right: No supraclavicular adenopathy present. Left: No supraclavicular adenopathy present. ASSESSMENT and PLAN    ICD-10-CM ICD-9-CM    1. Breast mass, left N63.20 611.72    2. HX: breast cancer Z85.3 V10.3      1. AALIYAH X 4 years  2. LEFT breast mass is calcifying scar tissue. No other masses identified on mammogram or ultrasound. 3. Mammogram today , BIRADS 2  4.  Pt has done very well with weight loss over the past year.   PRN follow up

## 2018-11-20 NOTE — PATIENT INSTRUCTIONS
Breast Lumps: Care Instructions  Your Care Instructions  Breast lumps are common, especially in women between ages 27 and 48. Many women's breasts feel lumpy and tender before their menstrual period. Women also may have lumps when they are breastfeeding. Breast lumps may go away after menopause. All new breast lumps in women after menopause should be checked by a doctor. Although lumps may be normal for you, it is important to have your doctor check any lump or thickness that is not like the rest of your breast to make sure it is not cancer. A lump may be larger, harder, or different from the rest of your breast tissue. Follow-up care is a key part of your treatment and safety. Be sure to make and go to all appointments, and call your doctor if you are having problems. It's also a good idea to know your test results and keep a list of the medicines you take. How can you care for yourself at home? · Make an appointment to have a mammogram and other follow-up visits as recommended by your doctor. When should you call for help? Watch closely for changes in your health, and be sure to contact your doctor if:    · You do not get better as expected.     · Your breast has changed.     · You have pain in your breast.     · You have a discharge from your nipple.     · A breast lump changes or does not go away. Where can you learn more? Go to http://zane-hannah.info/. Enter D741 in the search box to learn more about \"Breast Lumps: Care Instructions. \"  Current as of: May 15, 2018  Content Version: 11.8  © 7047-1712 [employer]. Care instructions adapted under license by [employer] (which disclaims liability or warranty for this information). If you have questions about a medical condition or this instruction, always ask your healthcare professional. [address] any warranty or liability for your use of this information.

## 2018-11-30 ENCOUNTER — OP HISTORICAL/CONVERTED ENCOUNTER (OUTPATIENT)
Dept: OTHER | Age: 63
End: 2018-11-30

## 2018-12-14 ENCOUNTER — OP HISTORICAL/CONVERTED ENCOUNTER (OUTPATIENT)
Dept: OTHER | Age: 63
End: 2018-12-14

## 2019-01-04 ENCOUNTER — OP HISTORICAL/CONVERTED ENCOUNTER (OUTPATIENT)
Dept: OTHER | Age: 64
End: 2019-01-04

## 2019-01-18 ENCOUNTER — OP HISTORICAL/CONVERTED ENCOUNTER (OUTPATIENT)
Dept: OTHER | Age: 64
End: 2019-01-18

## 2019-02-01 ENCOUNTER — OP HISTORICAL/CONVERTED ENCOUNTER (OUTPATIENT)
Dept: OTHER | Age: 64
End: 2019-02-01

## 2019-02-21 ENCOUNTER — OP HISTORICAL/CONVERTED ENCOUNTER (OUTPATIENT)
Dept: OTHER | Age: 64
End: 2019-02-21

## 2019-03-01 ENCOUNTER — OP HISTORICAL/CONVERTED ENCOUNTER (OUTPATIENT)
Dept: OTHER | Age: 64
End: 2019-03-01

## 2019-03-12 ENCOUNTER — OP HISTORICAL/CONVERTED ENCOUNTER (OUTPATIENT)
Dept: OTHER | Age: 64
End: 2019-03-12

## 2019-03-20 ENCOUNTER — IP HISTORICAL/CONVERTED ENCOUNTER (OUTPATIENT)
Dept: OTHER | Age: 64
End: 2019-03-20

## 2019-04-05 ENCOUNTER — OP HISTORICAL/CONVERTED ENCOUNTER (OUTPATIENT)
Dept: OTHER | Age: 64
End: 2019-04-05

## 2019-04-19 ENCOUNTER — OP HISTORICAL/CONVERTED ENCOUNTER (OUTPATIENT)
Dept: OTHER | Age: 64
End: 2019-04-19

## 2019-05-29 ENCOUNTER — TELEPHONE (OUTPATIENT)
Dept: ONCOLOGY | Age: 64
End: 2019-05-29

## 2019-05-29 NOTE — TELEPHONE ENCOUNTER
[employer] at [address]  [phone]    05/29/19- Fax received from [facility] stating patient may have stopped taking her anastrozole 5 mg. Phone call placed to patient- spoke to Mr. Alvarez. He confirmed she is taking medication daily as prescribed. She gets medication refilled from [facility]. He is aware of 7/3/19 follow up appointment and denied any further questions or concerns.

## 2019-07-01 NOTE — PROGRESS NOTES
[facility] [address]  [address] [facility], [address] [facility] [address] [facility]  Victoria Liker: [phone]  F: [phone]      Reason for Visit:   Rylee Morse is a 59 y.o. female who is seen for follow up of breast cancer. Treatment History:   · Mammogram and ultrasound 4/25/2014 at [facility]: BIRADS 5 left breast abnormality, increasing in size compared with exam 6 months prior  · Patient declined preoperative biopsy  · Left lumpectomy and sentinel node dissection 5/28/2014 with Dr. Rosario Mis: 2cm infiltrating ductal carcinoma, grade 3, %, ND 30%, HER2/jesusita negative, Ki67 30%, 0/12 nodes. Prosigna score 77 = high risk. · Stage IA (pT1c pN0 Mx) Left Breast Cancer  · Chemotherapy with TC x4 cycles from 7/10/14 to 9/11/2014  · Adjuvant radiation with Dr. Evelyn Ferrell 11/12/14 - 12/17/14  · Adjuvant endocrine therapy with Anastrozole beginning 1/3/2015     History of Present Illness:   She had bariatric surgery 3/2019. Lost 100 pounds before surgery, and almost 50 pounds since surgery. Continues on anastrozole pills, has remained on this throughout surgery and post-op period. No breast complaints. PAST HISTORY: The following sections were reviewed and updated in the EMR as appropriate: PMH, SH, FH, Medications, Allergies. No Known Allergies     Review of Systems: A complete review of systems was obtained, reviewed, and scanned into the EMR. Pertinent findings reviewed above. Physical Exam:     Visit Vitals  /73 (BP 1 Location: Left arm, BP Patient Position: Sitting)   Pulse 84   Temp 97.7 °F (36.5 °C) (Oral)   Resp 18   Ht 5' 1\" (1.549 m)   Wt 234 lb (106.1 kg)   SpO2 95%   BMI 44.21 kg/m²     ECOG PS: 0  General: No distress  Eyes: PERRLA, anicteric sclerae  HENT: Atraumatic, OP clear.    Neck: Supple  Lymphatic: No cervical, supraclavicular, or inguinal adenopathy  Respiratory: CTAB, normal respiratory effort  CV: Normal rate and rhythm, no murmurs, 1+ peripheral edema  GI: Soft, morbidly obese, nontender, nondistended, no masses. MS: Sitting on scooter. Digits without clubbing or cyanosis. Skin: No ecchymoses, or petechiae. Normal temperature, turgor, and texture. Psych: Alert, oriented, appropriate affect, normal judgment/insight  Breast:  Left breast with firm nodule in lumpectomy scar (biopsy ok), no other masses, skin changes, or nipple discharge. Right breast without masses, skin changes, or nipple discharge. Results:     Lab Results   Component Value Date/Time    WBC 7.9 09/30/2014 09:10 AM    HGB 11.2 (L) 09/30/2014 09:10 AM    HCT 34.0 (L) 09/30/2014 09:10 AM    PLATELET 362 35/24/3456 09:10 AM    MCV 90.9 09/30/2014 09:10 AM    ABS. NEUTROPHILS 5.3 09/30/2014 09:10 AM    Hemoglobin (POC) 10.2 (L) 09/19/2014 02:56 PM    Hematocrit (POC) 30 (L) 09/19/2014 02:56 PM     Lab Results   Component Value Date/Time    Sodium 140 05/23/2014 02:40 PM    Potassium 4.4 05/23/2014 02:40 PM    Chloride 102 05/23/2014 02:40 PM    CO2 29 05/23/2014 02:40 PM    Glucose 143 (H) 05/23/2014 02:40 PM    BUN 17 05/23/2014 02:40 PM    Creatinine 0.61 05/23/2014 02:40 PM    GFR est AA >60 05/23/2014 02:40 PM    GFR est non-AA >60 05/23/2014 02:40 PM    Calcium 9.2 05/23/2014 02:40 PM    Sodium (POC) 138 09/19/2014 02:56 PM    Potassium (POC) 4.5 09/19/2014 02:56 PM    Chloride (POC) 102 09/19/2014 02:56 PM    Glucose (POC) 102 09/19/2014 02:56 PM    BUN (POC) 19 09/19/2014 02:56 PM    Creatinine (POC) 1.1 09/19/2014 02:56 PM    Calcium, ionized (POC) 1.17 09/19/2014 02:56 PM     Lab Results   Component Value Date/Time    Bilirubin, total 0.4 05/23/2014 02:40 PM    ALT (SGPT) 38 05/23/2014 02:40 PM    AST (SGOT) 22 05/23/2014 02:40 PM    Alk.  phosphatase 91 05/23/2014 02:40 PM    Protein, total 7.1 05/23/2014 02:40 PM    Albumin 3.4 (L) 05/23/2014 02:40 PM    Globulin 3.7 05/23/2014 02:40 PM       DEXA 11/11/2014: normal   DEXA 11/03/2016: normal  DEXA 11/20/2018: normal      Mammogram 7/07/2015: benign  Mammogram 6/28/2016: benign  Bilateral mammogram and left breast US 7/20/2017: benign  Bilateral mammogram and left breast US 11/20/2018: benign    Breast biopsy 3/27/2017 by Dr. Bernardino Be: benign breast tissue with fibrocystic changes. microcalcifications and focal chronic inflammation identified. Assessment:   1) Breast Cancer, Left  Stage IA, ER/MN positive, HER2 negative, high risk prosigna score  She is s/p resection followed by adjuvant chemotherapy with TC x 4 and adjuvant radiation. She is now on adjuvant endocrine therapy with anastrozole, with plans for 5 years of therapy. She has no evidence of recurrence at this time. We will continue with treatment and surveillance. We will plan to see her back every 6 months on therapy. Continue yearly mammogram.    2) Bone Health  Increased risk of osteoporosis while on AI therapy. Continue calcium and vitamin D. Last DEXA normal.    3) Depression  Improved. Continues to follow with psychiatry. 4) Hot flashes  Improved. Likely related to AI. Monitor for now. 5) Obesity  Improving after bariatric surgery.     Plan:     · Continue anastrazole 1mg PO daily through 1/2020  · Continue Calcium, Vitamin D  · Mammogram yearly, due 11/2019 at time of surgery appt  · DEXA every 2 years, due 11/2020  · Return to clinic in 6 months, or sooner if needed      Signed By: Lakshmi Pretty MD

## 2019-07-03 ENCOUNTER — OFFICE VISIT (OUTPATIENT)
Dept: ONCOLOGY | Age: 64
End: 2019-07-03

## 2019-07-03 VITALS
RESPIRATION RATE: 18 BRPM | DIASTOLIC BLOOD PRESSURE: 73 MMHG | SYSTOLIC BLOOD PRESSURE: 120 MMHG | HEIGHT: 61 IN | BODY MASS INDEX: 44.18 KG/M2 | HEART RATE: 84 BPM | TEMPERATURE: 97.7 F | WEIGHT: 234 LBS | OXYGEN SATURATION: 95 %

## 2019-07-03 DIAGNOSIS — Z17.0 MALIGNANT NEOPLASM OF LEFT BREAST IN FEMALE, ESTROGEN RECEPTOR POSITIVE, UNSPECIFIED SITE OF BREAST (HCC): Primary | ICD-10-CM

## 2019-07-03 DIAGNOSIS — C50.912 MALIGNANT NEOPLASM OF LEFT BREAST IN FEMALE, ESTROGEN RECEPTOR POSITIVE, UNSPECIFIED SITE OF BREAST (HCC): Primary | ICD-10-CM

## 2019-12-02 DIAGNOSIS — C50.912 MALIGNANT NEOPLASM OF LEFT BREAST IN FEMALE, ESTROGEN RECEPTOR POSITIVE, UNSPECIFIED SITE OF BREAST (HCC): ICD-10-CM

## 2019-12-02 DIAGNOSIS — Z17.0 MALIGNANT NEOPLASM OF LEFT BREAST IN FEMALE, ESTROGEN RECEPTOR POSITIVE, UNSPECIFIED SITE OF BREAST (HCC): ICD-10-CM

## 2019-12-02 RX ORDER — ANASTROZOLE 1 MG/1
TABLET ORAL
Qty: 90 TAB | Refills: 3 | Status: SHIPPED | OUTPATIENT
Start: 2019-12-02

## 2020-01-07 ENCOUNTER — DOCUMENTATION ONLY (OUTPATIENT)
Dept: SURGERY | Age: 65
End: 2020-01-07

## 2020-01-07 NOTE — PROGRESS NOTES
Returned call to Frankie at [facility]. She L/M earlier that she needed an order for a diagnostic mammogram for the patient since she had breast cancer less than 5 years ago. I checked her chart, and her diagnosis was actually in 2014, so it is now her 6th year. I L/M on her voicemail that she now gets a screening mammogram.  There are two orders put in the computer by her for screening mammograms and an order placed by Dr. Bia Rodriguez for a diagnosic mammogram, so nothing needs to be done.

## 2020-01-17 ENCOUNTER — TELEPHONE (OUTPATIENT)
Dept: SURGERY | Age: 65
End: 2020-01-17

## 2020-01-17 DIAGNOSIS — Z85.3 HISTORY OF LEFT BREAST CANCER: Primary | ICD-10-CM

## 2020-01-17 NOTE — TELEPHONE ENCOUNTER
Received call from [address] [facility] with [facility], requesting order for diagnostic mammogram. Patient has history of LEFT breast cancer, which is >5 years ago, but reported new breast lumps to . Ordered BILATERAL 3D mammogram for patient in [facility].

## 2020-02-25 ENCOUNTER — HOSPITAL ENCOUNTER (OUTPATIENT)
Dept: MAMMOGRAPHY | Age: 65
Discharge: HOME | End: 2020-02-25
Attending: SURGERY
Payer: MEDICAID

## 2020-02-25 ENCOUNTER — OFFICE VISIT (OUTPATIENT)
Dept: SURGERY | Age: 65
End: 2020-02-25

## 2020-02-25 VITALS
HEART RATE: 76 BPM | HEIGHT: 61 IN | WEIGHT: 205 LBS | DIASTOLIC BLOOD PRESSURE: 66 MMHG | SYSTOLIC BLOOD PRESSURE: 103 MMHG | BODY MASS INDEX: 38.71 KG/M2

## 2020-02-25 DIAGNOSIS — Z85.3 HX OF BREAST CANCER: ICD-10-CM

## 2020-02-25 DIAGNOSIS — N63.20 LUMP OF LEFT BREAST: ICD-10-CM

## 2020-02-25 DIAGNOSIS — Z85.3 HISTORY OF LEFT BREAST CANCER: ICD-10-CM

## 2020-02-25 DIAGNOSIS — N63.20 MASS OF LEFT BREAST: Primary | ICD-10-CM

## 2020-02-25 DIAGNOSIS — Z12.31 SCREENING MAMMOGRAM, ENCOUNTER FOR: Primary | ICD-10-CM

## 2020-02-25 PROCEDURE — 76642 ULTRASOUND BREAST LIMITED: CPT

## 2020-02-25 PROCEDURE — 77062 BREAST TOMOSYNTHESIS BI: CPT

## 2020-02-25 NOTE — PROGRESS NOTES
HISTORY OF PRESENT ILLNESS  Vi France is a 62 y.o. female. HPI  ESTABLISHED patient here today for follow up LEFT breast cancer. The patient is S/P LEFT lumpectomy chemotherapy  radiation and Anastrozole 5/2014. The patient is tolerating the anastrozole without any side effects. She had her annual mammogram today and she denies any palpable lumps, nipple inversion or discharge. She has lost 200 pounds since her bypass surgery last year (100 lbs before surgery and another 100lbs after). Is now off all blood pressure and diabetic medications. Her  has recurrent cancer. 5/2014 LEFT lumpectomy, 2cm Infiltrating ductal carcinoma, grade 3, 0/12 nodes, %. WA 30% Ki 30% (DICS, %. WA 50%), Her 2 neg  TC  XRT 12/2014  Anastrazole    [facility] Results (most recent):  Results from [place] encounter on 02/25/20   [facility] 3D BROCK W MAMMO BI DX INCL CAD    Narrative INDICATION: Left lumpectomy, palpable abnormalities left breast.  COMPARISON: Most recent 2018  . COMPOSITION: The breast tissue is almost entirely fat-replaced. .  TECHNIQUE: Standard 2D, CC and MLO views of each breast were performed with  digital technique and subjected to computer-aided detection. Tomosynthesis of  each breast was performed in CC and MLO projections. Spot views of the palpable  abnormalities were performed. Theresa Osei FINDINGS:   Lumpectomy changes on the left are stable. No new masses or microcalcifications  are seen. Realtime ultrasonography of the palpable abnormalities at 1-2:00 on  the left demonstrates calcifications and areas of fat versus without suspicious  mass. Impression IMPRESSION:  1. BI-RADS category 2, benign. 2. The patient should return in one year for routine bilateral mammography. 3. She was informed. ROS    Physical Exam  Chest:      Breasts:         Left: Mass (1.5 cm hard fixed round mass inferior to lumpectomy scar UOQ) present.              ASSESSMENT and PLAN    ICD-10-CM ICD-9-CM    1. Screening mammogram, encounter for Z12.31 V76.12 TEODORO MAMMO BI SCREENING INCL CAD   2. Hx of breast cancer Z85.3 V10.3      AALIYAH  The lumpectomy scar is calcified. Continue annual mammograms.

## 2020-02-25 NOTE — PATIENT INSTRUCTIONS

## 2020-07-09 ENCOUNTER — OP HISTORICAL/CONVERTED ENCOUNTER (OUTPATIENT)
Dept: OTHER | Age: 65
End: 2020-07-09

## 2020-08-04 ENCOUNTER — OP HISTORICAL/CONVERTED ENCOUNTER (OUTPATIENT)
Dept: OTHER | Age: 65
End: 2020-08-04

## 2020-08-11 ENCOUNTER — TELEPHONE (OUTPATIENT)
Dept: SURGERY | Age: 65
End: 2020-08-11

## 2020-08-18 ENCOUNTER — IP HISTORICAL/CONVERTED ENCOUNTER (OUTPATIENT)
Dept: OTHER | Age: 65
End: 2020-08-18

## 2020-08-31 RX ORDER — SERTRALINE HYDROCHLORIDE 50 MG/1
TABLET, FILM COATED ORAL
Qty: 270 TAB | Refills: 0 | Status: SHIPPED | OUTPATIENT
Start: 2020-08-31 | End: 2021-08-26 | Stop reason: SDUPTHER

## 2020-09-14 ENCOUNTER — OFFICE VISIT (OUTPATIENT)
Dept: SURGERY | Age: 65
End: 2020-09-14
Payer: COMMERCIAL

## 2020-09-14 VITALS
BODY MASS INDEX: 40.87 KG/M2 | TEMPERATURE: 98.4 F | SYSTOLIC BLOOD PRESSURE: 103 MMHG | DIASTOLIC BLOOD PRESSURE: 73 MMHG | WEIGHT: 216.5 LBS | HEART RATE: 88 BPM | HEIGHT: 61 IN

## 2020-09-14 DIAGNOSIS — Z98.84 HISTORY OF BARIATRIC SURGERY: Primary | ICD-10-CM

## 2020-09-14 DIAGNOSIS — M79.3 PANNICULITIS: ICD-10-CM

## 2020-09-14 DIAGNOSIS — Z98.84 HISTORY OF BARIATRIC SURGERY: ICD-10-CM

## 2020-09-14 PROBLEM — G93.2 BENIGN INTRACRANIAL HYPERTENSION: Status: ACTIVE | Noted: 2020-09-14

## 2020-09-14 PROBLEM — K21.9 GERD (GASTROESOPHAGEAL REFLUX DISEASE): Status: ACTIVE | Noted: 2020-09-14

## 2020-09-14 PROBLEM — R11.0 NAUSEA: Status: ACTIVE | Noted: 2020-09-14

## 2020-09-14 PROBLEM — E78.00 HYPERCHOLESTEROLEMIA: Status: ACTIVE | Noted: 2020-09-14

## 2020-09-14 PROBLEM — I10 HYPERTENSION: Status: ACTIVE | Noted: 2020-09-14

## 2020-09-14 PROBLEM — I27.20 PULMONARY HYPERTENSION (HCC): Status: ACTIVE | Noted: 2020-09-14

## 2020-09-14 PROBLEM — E11.9 DM (DIABETES MELLITUS) (HCC): Status: ACTIVE | Noted: 2020-09-14

## 2020-09-14 PROCEDURE — 99214 OFFICE O/P EST MOD 30 MIN: CPT | Performed by: SURGERY

## 2020-09-14 RX ORDER — GABAPENTIN 300 MG/1
1 CAPSULE ORAL 2 TIMES DAILY
COMMUNITY
Start: 2019-02-02 | End: 2021-01-26

## 2020-09-14 RX ORDER — ATORVASTATIN CALCIUM 20 MG/1
1 TABLET, FILM COATED ORAL DAILY
COMMUNITY
Start: 2019-01-22

## 2020-09-14 RX ORDER — AMOXICILLIN AND CLAVULANATE POTASSIUM 875; 125 MG/1; MG/1
TABLET, FILM COATED ORAL
Status: ON HOLD | COMMUNITY
Start: 2020-09-10 | End: 2020-09-18

## 2020-09-14 NOTE — LETTER
SURGERY ORDERS 
 
 
PATIENT NAME:  Arianne Stoner ADDRESS:   [address] [place] [id number] :    1955 DATE OF SURGERY:  __________________ I CERTIFY THIS ADMISSION TO BE MEDICALLY NECESSARY. ADMITTING DIAGNOSIS: Panniculitis Admit to the service of: Dr. Adithya Connelly Allergies: NKDA Patient Status: (x) 23 hour observation status () Admit to Inpatient: I certify this admission to be medically necessary. Check admitting unit: () Regular Medical (x) Medical Telemetry () 4 W Telemetry  
() ICU (x) Surgical () Psychiatric () Obstetrics () Pediatrics () Other______________ DISCHARGE PLANS: (x) Home () Extended Care () Other__________________ PRE-OP SCREENING ORDERS Implement order as indicated by (X) Consent and Order for: panniculectomy CPT Code: __________________ Anesthesia Visit Required: () Yes Reason:_________________________ Anesthesia Preference:  
(x) General () Regional () M.A.C. () Local () Post-op Pain Management Block () On-Q Ball  
(X) Pre-Op Testing Orders Protocol (Check or list any additional test in addition to Protocol) (X) MRSA PCR on Same Day Admissions ONLY. (x) Type & Screen (x) CBC () CMP (x) BMP 
() HCG (Qual.) () UA () Urine C&S (X) Other: COVID 
(X) Beta Blocker - if currently on beta blocker, advise patient to continue with small sip of H2O. 
() PCA (attach PCA orders) () On-Q Pump (attach On-Q Pump orders) 
() Chlorhexidine Gluconate 4% shower pm/am 
NPO after midnight on_____________, take all regular medications except_______________________________ 
() Crutch training 
(X) Initiate Spirometry Education DAY OF SURGERY ORDERS AS FOLLOWS:  
1. IV fluids of 500 mL 0.9% Sodium Chloride 20 mL per hour for renal and cataract patients. (May use 22g IV catheter) OR 1000 mL Lactated Ringers 20 mL per hour for all other patients age 15 and older. 2. May use Lidocaine 1% up to 2mL intradermal prior to IV cannulation 3. (X) AccuCheck on admission on all diabetic patients. 4. STAT a.m. labs day of surgery: (X) STAT Rapid Potassium level (patients with history of renal failure) 
() Clean Catch Urine for C&S () CBC () CMP () BMP () HCG () T&S  
() Other labs/Reason______________________________________________________________________ 5. (x) SCIP Approved IV Antibiotic (on call to OR) Ancef 2 g IV 
6. () Physician aware of PCN allergy 7. () [place] Catheter: () Admission (X) O.R. (X) C&S if covington inserted () C&S if covington present 8. Sequential Compression Stockings () O.R. only () Post-op only (x) O.R. and Post-op 9. Enema: () SSE () Fleets 10. Other: 
Tylenol 1g PO Gabapentin 300 mg PO 
____________________________________________________________ 
 
 
 
 
____________________________________________________________  Wesley Witt MD                      9/14/2020                             12:07 pm
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
## 2020-09-14 NOTE — LETTER
9/14/20 Patient: Veronica Mcdonald YOB: 1955 Date of Visit: 9/14/2020 Kathia Belle NP 
[address],[place] [id number] VIA Facsimile: [phone] Dear Kathia Belle NP, Thank you for referring Ms. Olga Lidia Castaneda to [address] [place] [address] for evaluation. My notes for this consultation are attached. If you have questions, please do not hesitate to call me. I look forward to following your patient along with you. Sincerely, Moses Kwan MD

## 2020-09-17 RX ORDER — BISMUTH SUBSALICYLATE 262 MG
1 TABLET,CHEWABLE ORAL DAILY
COMMUNITY

## 2020-09-17 NOTE — PERIOP NOTES
[address] [facility] office called, with permission given by patient during phone interview, requested most recent office note and any cardiac test results to be faxed to Colin Gamez as soon as possible.

## 2020-09-18 ENCOUNTER — APPOINTMENT (OUTPATIENT)
Dept: GENERAL RADIOLOGY | Age: 65
End: 2020-09-18
Attending: ORTHOPAEDIC SURGERY
Payer: MEDICAID

## 2020-09-18 ENCOUNTER — ANESTHESIA (OUTPATIENT)
Dept: SURGERY | Age: 65
End: 2020-09-18
Payer: MEDICAID

## 2020-09-18 ENCOUNTER — HOSPITAL ENCOUNTER (OUTPATIENT)
Age: 65
Discharge: HOME HEALTH | End: 2020-09-21
Attending: ORTHOPAEDIC SURGERY | Admitting: ORTHOPAEDIC SURGERY
Payer: MEDICAID

## 2020-09-18 ENCOUNTER — ANESTHESIA EVENT (OUTPATIENT)
Dept: SURGERY | Age: 65
End: 2020-09-18
Payer: MEDICAID

## 2020-09-18 DIAGNOSIS — N39.41 URGE INCONTINENCE OF URINE: ICD-10-CM

## 2020-09-18 DIAGNOSIS — N30.00 ACUTE CYSTITIS WITHOUT HEMATURIA: ICD-10-CM

## 2020-09-18 DIAGNOSIS — T81.41XA INFECTION OF SUPERFICIAL INCISIONAL SURGICAL SITE AFTER PROCEDURE, INITIAL ENCOUNTER: ICD-10-CM

## 2020-09-18 DIAGNOSIS — T81.31XA DEHISCENCE OF OPERATIVE WOUND, INITIAL ENCOUNTER: ICD-10-CM

## 2020-09-18 DIAGNOSIS — G95.9 CERVICAL MYELOPATHY (HCC): ICD-10-CM

## 2020-09-18 PROBLEM — T81.40XA POSTOPERATIVE INFECTION: Status: ACTIVE | Noted: 2020-09-18

## 2020-09-18 LAB
ANION GAP SERPL CALC-SCNC: 4 MMOL/L (ref 5–15)
BASOPHILS # BLD: 0 K/UL (ref 0–0.1)
BASOPHILS NFR BLD: 1 % (ref 0–1)
BUN SERPL-MCNC: 22 MG/DL (ref 6–20)
BUN/CREAT SERPL: 29 (ref 12–20)
CA-I BLD-MCNC: 9.4 MG/DL (ref 8.5–10.1)
CHLORIDE SERPL-SCNC: 107 MMOL/L (ref 97–108)
CO2 SERPL-SCNC: 29 MMOL/L (ref 21–32)
CREAT SERPL-MCNC: 0.76 MG/DL (ref 0.55–1.02)
DIFFERENTIAL METHOD BLD: NORMAL
EOSINOPHIL # BLD: 0 K/UL (ref 0–0.4)
EOSINOPHIL NFR BLD: 0 % (ref 0–7)
ERYTHROCYTE [DISTWIDTH] IN BLOOD BY AUTOMATED COUNT: 13.8 % (ref 11.5–14.5)
GLUCOSE SERPL-MCNC: 84 MG/DL (ref 65–100)
HCT VFR BLD AUTO: 36.8 % (ref 35–47)
HGB BLD-MCNC: 11.9 % (ref 11.5–16)
IMM GRANULOCYTES # BLD AUTO: 0 K/UL (ref 0–0.04)
IMM GRANULOCYTES NFR BLD AUTO: 0 % (ref 0–0.5)
LYMPHOCYTES # BLD: 2.3 K/UL (ref 0.8–3.5)
LYMPHOCYTES NFR BLD: 37 % (ref 12–49)
MCH RBC QN AUTO: 28.4 PG (ref 26–34)
MCHC RBC AUTO-ENTMCNC: 32.3 G/DL (ref 30–36.5)
MCV RBC AUTO: 87.8 FL (ref 80–99)
MONOCYTES # BLD: 0.5 K/UL (ref 0–1)
MONOCYTES NFR BLD: 8 % (ref 5–13)
NEUTS SEG # BLD: 3.4 K/UL (ref 1.8–8)
NEUTS SEG NFR BLD: 54 % (ref 32–75)
PLATELET # BLD AUTO: 346 K/UL (ref 150–400)
PMV BLD AUTO: 9.9 FL (ref 8.9–12.9)
POTASSIUM SERPL-SCNC: 4.8 MMOL/L (ref 3.5–5.1)
RBC # BLD AUTO: 4.19 M/UL (ref 3.8–5.2)
SODIUM SERPL-SCNC: 140 MMOL/L (ref 136–145)
WBC # BLD AUTO: 6.1 K/UL (ref 3.6–11)

## 2020-09-18 PROCEDURE — 77030041703 HC SLV COMPR DVT ARJO -B: Performed by: ORTHOPAEDIC SURGERY

## 2020-09-18 PROCEDURE — 80048 BASIC METABOLIC PNL TOTAL CA: CPT

## 2020-09-18 PROCEDURE — 74011000258 HC RX REV CODE- 258: Performed by: NURSE ANESTHETIST, CERTIFIED REGISTERED

## 2020-09-18 PROCEDURE — 74011250636 HC RX REV CODE- 250/636: Performed by: ORTHOPAEDIC SURGERY

## 2020-09-18 PROCEDURE — 74011250636 HC RX REV CODE- 250/636: Performed by: NURSE ANESTHETIST, CERTIFIED REGISTERED

## 2020-09-18 PROCEDURE — 76210000022 HC REC RM PH II 1.5 TO 2 HR: Performed by: ORTHOPAEDIC SURGERY

## 2020-09-18 PROCEDURE — 74011250637 HC RX REV CODE- 250/637: Performed by: ORTHOPAEDIC SURGERY

## 2020-09-18 PROCEDURE — 99218 HC RM OBSERVATION: CPT

## 2020-09-18 PROCEDURE — 76060000032 HC ANESTHESIA 0.5 TO 1 HR: Performed by: ORTHOPAEDIC SURGERY

## 2020-09-18 PROCEDURE — 74011000272 HC RX REV CODE- 272: Performed by: ORTHOPAEDIC SURGERY

## 2020-09-18 PROCEDURE — 73030 X-RAY EXAM OF SHOULDER: CPT

## 2020-09-18 PROCEDURE — 36415 COLL VENOUS BLD VENIPUNCTURE: CPT

## 2020-09-18 PROCEDURE — 77030038156 HC CRD BPLR DISP CARF -A: Performed by: ORTHOPAEDIC SURGERY

## 2020-09-18 PROCEDURE — 76210000017 HC OR PH I REC 1.5 TO 2 HR: Performed by: ORTHOPAEDIC SURGERY

## 2020-09-18 PROCEDURE — 85025 COMPLETE CBC W/AUTO DIFF WBC: CPT

## 2020-09-18 PROCEDURE — 2709999900 HC NON-CHARGEABLE SUPPLY: Performed by: ORTHOPAEDIC SURGERY

## 2020-09-18 PROCEDURE — 77030031139 HC SUT VCRL2 J&J -A: Performed by: ORTHOPAEDIC SURGERY

## 2020-09-18 PROCEDURE — 76010000138 HC OR TIME 0.5 TO 1 HR: Performed by: ORTHOPAEDIC SURGERY

## 2020-09-18 PROCEDURE — 87205 SMEAR GRAM STAIN: CPT

## 2020-09-18 PROCEDURE — 77030011265 HC ELECTRD BLD HEX COVD -A: Performed by: ORTHOPAEDIC SURGERY

## 2020-09-18 PROCEDURE — 74011000250 HC RX REV CODE- 250: Performed by: NURSE ANESTHETIST, CERTIFIED REGISTERED

## 2020-09-18 PROCEDURE — 77030018673: Performed by: ORTHOPAEDIC SURGERY

## 2020-09-18 PROCEDURE — 77030002916 HC SUT ETHLN J&J -A: Performed by: ORTHOPAEDIC SURGERY

## 2020-09-18 RX ORDER — ANASTROZOLE 1 MG/1
1 TABLET ORAL DAILY
Status: DISCONTINUED | OUTPATIENT
Start: 2020-09-19 | End: 2020-09-21 | Stop reason: HOSPADM

## 2020-09-18 RX ORDER — BISMUTH SUBSALICYLATE 262 MG
1 TABLET,CHEWABLE ORAL DAILY
Status: DISCONTINUED | OUTPATIENT
Start: 2020-09-19 | End: 2020-09-21 | Stop reason: HOSPADM

## 2020-09-18 RX ORDER — SODIUM CHLORIDE 9 MG/ML
125 INJECTION, SOLUTION INTRAVENOUS CONTINUOUS
Status: DISPENSED | OUTPATIENT
Start: 2020-09-18 | End: 2020-09-19

## 2020-09-18 RX ORDER — AMOXICILLIN 250 MG
1 CAPSULE ORAL 2 TIMES DAILY
Status: DISCONTINUED | OUTPATIENT
Start: 2020-09-18 | End: 2020-09-21 | Stop reason: HOSPADM

## 2020-09-18 RX ORDER — ATORVASTATIN CALCIUM 20 MG/1
20 TABLET, FILM COATED ORAL DAILY
Status: DISCONTINUED | OUTPATIENT
Start: 2020-09-19 | End: 2020-09-21 | Stop reason: HOSPADM

## 2020-09-18 RX ORDER — LABETALOL HCL 20 MG/4 ML
10 SYRINGE (ML) INTRAVENOUS
Status: DISCONTINUED | OUTPATIENT
Start: 2020-09-18 | End: 2020-09-18

## 2020-09-18 RX ORDER — SERTRALINE HYDROCHLORIDE 50 MG/1
150 TABLET, FILM COATED ORAL DAILY
Status: DISCONTINUED | OUTPATIENT
Start: 2020-09-19 | End: 2020-09-21 | Stop reason: HOSPADM

## 2020-09-18 RX ORDER — LORATADINE 10 MG/1
10 TABLET ORAL DAILY
Status: DISCONTINUED | OUTPATIENT
Start: 2020-09-19 | End: 2020-09-21 | Stop reason: HOSPADM

## 2020-09-18 RX ORDER — NALOXONE HYDROCHLORIDE 0.4 MG/ML
0.4 INJECTION, SOLUTION INTRAMUSCULAR; INTRAVENOUS; SUBCUTANEOUS AS NEEDED
Status: DISCONTINUED | OUTPATIENT
Start: 2020-09-18 | End: 2020-09-21 | Stop reason: HOSPADM

## 2020-09-18 RX ORDER — OXYBUTYNIN CHLORIDE 5 MG/1
5 TABLET ORAL 3 TIMES DAILY
Status: DISCONTINUED | OUTPATIENT
Start: 2020-09-18 | End: 2020-09-21 | Stop reason: HOSPADM

## 2020-09-18 RX ORDER — PROPOFOL 10 MG/ML
INJECTION, EMULSION INTRAVENOUS AS NEEDED
Status: DISCONTINUED | OUTPATIENT
Start: 2020-09-18 | End: 2020-09-18 | Stop reason: HOSPADM

## 2020-09-18 RX ORDER — SUCCINYLCHOLINE CHLORIDE 20 MG/ML
INJECTION INTRAMUSCULAR; INTRAVENOUS AS NEEDED
Status: DISCONTINUED | OUTPATIENT
Start: 2020-09-18 | End: 2020-09-18 | Stop reason: HOSPADM

## 2020-09-18 RX ORDER — SODIUM CHLORIDE 0.9 % (FLUSH) 0.9 %
5-40 SYRINGE (ML) INJECTION AS NEEDED
Status: DISCONTINUED | OUTPATIENT
Start: 2020-09-18 | End: 2020-09-21 | Stop reason: HOSPADM

## 2020-09-18 RX ORDER — HYDROMORPHONE HYDROCHLORIDE 1 MG/ML
INJECTION, SOLUTION INTRAMUSCULAR; INTRAVENOUS; SUBCUTANEOUS AS NEEDED
Status: DISCONTINUED | OUTPATIENT
Start: 2020-09-18 | End: 2020-09-18 | Stop reason: HOSPADM

## 2020-09-18 RX ORDER — PANTOPRAZOLE SODIUM 40 MG/1
40 TABLET, DELAYED RELEASE ORAL DAILY
Status: DISCONTINUED | OUTPATIENT
Start: 2020-09-19 | End: 2020-09-21 | Stop reason: HOSPADM

## 2020-09-18 RX ORDER — SODIUM CHLORIDE 0.9 % (FLUSH) 0.9 %
5-40 SYRINGE (ML) INJECTION EVERY 8 HOURS
Status: DISCONTINUED | OUTPATIENT
Start: 2020-09-18 | End: 2020-09-21 | Stop reason: HOSPADM

## 2020-09-18 RX ORDER — ONDANSETRON 2 MG/ML
INJECTION INTRAMUSCULAR; INTRAVENOUS AS NEEDED
Status: DISCONTINUED | OUTPATIENT
Start: 2020-09-18 | End: 2020-09-18 | Stop reason: HOSPADM

## 2020-09-18 RX ORDER — SODIUM CHLORIDE 0.9 G/100ML
IRRIGANT IRRIGATION AS NEEDED
Status: DISCONTINUED | OUTPATIENT
Start: 2020-09-18 | End: 2020-09-18 | Stop reason: HOSPADM

## 2020-09-18 RX ORDER — LIDOCAINE HYDROCHLORIDE 20 MG/ML
INJECTION, SOLUTION EPIDURAL; INFILTRATION; INTRACAUDAL; PERINEURAL AS NEEDED
Status: DISCONTINUED | OUTPATIENT
Start: 2020-09-18 | End: 2020-09-18 | Stop reason: HOSPADM

## 2020-09-18 RX ORDER — DEXAMETHASONE SODIUM PHOSPHATE 4 MG/ML
INJECTION, SOLUTION INTRA-ARTICULAR; INTRALESIONAL; INTRAMUSCULAR; INTRAVENOUS; SOFT TISSUE AS NEEDED
Status: DISCONTINUED | OUTPATIENT
Start: 2020-09-18 | End: 2020-09-18 | Stop reason: HOSPADM

## 2020-09-18 RX ORDER — ROPINIROLE 1 MG/1
3 TABLET, FILM COATED ORAL
Status: DISCONTINUED | OUTPATIENT
Start: 2020-09-18 | End: 2020-09-21 | Stop reason: HOSPADM

## 2020-09-18 RX ORDER — BUPROPION HYDROCHLORIDE 150 MG/1
150 TABLET ORAL DAILY
Status: DISCONTINUED | OUTPATIENT
Start: 2020-09-19 | End: 2020-09-21 | Stop reason: HOSPADM

## 2020-09-18 RX ORDER — GABAPENTIN 300 MG/1
300 CAPSULE ORAL 2 TIMES DAILY
Status: DISCONTINUED | OUTPATIENT
Start: 2020-09-18 | End: 2020-09-21 | Stop reason: HOSPADM

## 2020-09-18 RX ORDER — SODIUM CHLORIDE 0.9 % (FLUSH) 0.9 %
5-40 SYRINGE (ML) INJECTION EVERY 8 HOURS
Status: DISCONTINUED | OUTPATIENT
Start: 2020-09-18 | End: 2020-09-18 | Stop reason: HOSPADM

## 2020-09-18 RX ORDER — FACIAL-BODY WIPES
10 EACH TOPICAL DAILY PRN
Status: DISCONTINUED | OUTPATIENT
Start: 2020-09-20 | End: 2020-09-21 | Stop reason: HOSPADM

## 2020-09-18 RX ORDER — HYDRALAZINE HYDROCHLORIDE 20 MG/ML
10 INJECTION INTRAMUSCULAR; INTRAVENOUS
Status: DISCONTINUED | OUTPATIENT
Start: 2020-09-18 | End: 2020-09-18 | Stop reason: SDUPTHER

## 2020-09-18 RX ORDER — CEFAZOLIN SODIUM IN 0.9 % NACL 2 G/100 ML
2 PLASTIC BAG, INJECTION (ML) INTRAVENOUS ONCE
Status: COMPLETED | OUTPATIENT
Start: 2020-09-18 | End: 2020-09-18

## 2020-09-18 RX ORDER — TRAMADOL HYDROCHLORIDE 50 MG/1
50 TABLET ORAL
Status: DISCONTINUED | OUTPATIENT
Start: 2020-09-18 | End: 2020-09-19

## 2020-09-18 RX ORDER — SODIUM CHLORIDE, SODIUM LACTATE, POTASSIUM CHLORIDE, CALCIUM CHLORIDE 600; 310; 30; 20 MG/100ML; MG/100ML; MG/100ML; MG/100ML
25 INJECTION, SOLUTION INTRAVENOUS CONTINUOUS
Status: DISCONTINUED | OUTPATIENT
Start: 2020-09-18 | End: 2020-09-18 | Stop reason: HOSPADM

## 2020-09-18 RX ORDER — SODIUM CHLORIDE 0.9 % (FLUSH) 0.9 %
5-40 SYRINGE (ML) INJECTION AS NEEDED
Status: DISCONTINUED | OUTPATIENT
Start: 2020-09-18 | End: 2020-09-18 | Stop reason: HOSPADM

## 2020-09-18 RX ORDER — DIAZEPAM 5 MG/1
5 TABLET ORAL DAILY
Status: DISCONTINUED | OUTPATIENT
Start: 2020-09-19 | End: 2020-09-21 | Stop reason: HOSPADM

## 2020-09-18 RX ORDER — FENTANYL CITRATE 50 UG/ML
INJECTION, SOLUTION INTRAMUSCULAR; INTRAVENOUS AS NEEDED
Status: DISCONTINUED | OUTPATIENT
Start: 2020-09-18 | End: 2020-09-18 | Stop reason: HOSPADM

## 2020-09-18 RX ORDER — POLYETHYLENE GLYCOL 3350 17 G/17G
17 POWDER, FOR SOLUTION ORAL DAILY
Status: DISCONTINUED | OUTPATIENT
Start: 2020-09-19 | End: 2020-09-21 | Stop reason: HOSPADM

## 2020-09-18 RX ORDER — METOPROLOL TARTRATE 5 MG/5ML
2.5 INJECTION INTRAVENOUS
Status: DISCONTINUED | OUTPATIENT
Start: 2020-09-18 | End: 2020-09-18

## 2020-09-18 RX ADMIN — SODIUM CHLORIDE, POTASSIUM CHLORIDE, SODIUM LACTATE AND CALCIUM CHLORIDE: 600; 310; 30; 20 INJECTION, SOLUTION INTRAVENOUS at 08:10

## 2020-09-18 RX ADMIN — Medication 10 ML: at 23:10

## 2020-09-18 RX ADMIN — PHENYLEPHRINE HYDROCHLORIDE 200 MCG: 10 INJECTION INTRAVENOUS at 12:08

## 2020-09-18 RX ADMIN — CEFAZOLIN 2 G: 10 INJECTION, POWDER, FOR SOLUTION INTRAVENOUS; PARENTERAL at 12:19

## 2020-09-18 RX ADMIN — Medication 10 ML: at 17:49

## 2020-09-18 RX ADMIN — LIDOCAINE HYDROCHLORIDE 100 MG: 20 INJECTION, SOLUTION EPIDURAL; INFILTRATION; INTRACAUDAL; PERINEURAL at 12:01

## 2020-09-18 RX ADMIN — OXYBUTYNIN CHLORIDE 5 MG: 5 TABLET ORAL at 23:08

## 2020-09-18 RX ADMIN — ROPINIROLE HYDROCHLORIDE 3 MG: 1 TABLET, FILM COATED ORAL at 23:08

## 2020-09-18 RX ADMIN — TRAMADOL HYDROCHLORIDE 50 MG: 50 TABLET, FILM COATED ORAL at 23:11

## 2020-09-18 RX ADMIN — PHENYLEPHRINE HYDROCHLORIDE 200 MCG: 10 INJECTION INTRAVENOUS at 12:19

## 2020-09-18 RX ADMIN — DEXAMETHASONE SODIUM PHOSPHATE 4 MG: 4 INJECTION, SOLUTION INTRA-ARTICULAR; INTRALESIONAL; INTRAMUSCULAR; INTRAVENOUS; SOFT TISSUE at 12:26

## 2020-09-18 RX ADMIN — HYDROMORPHONE HYDROCHLORIDE 1 MG: 1 INJECTION, SOLUTION INTRAMUSCULAR; INTRAVENOUS; SUBCUTANEOUS at 11:53

## 2020-09-18 RX ADMIN — PROPOFOL 150 MG: 10 INJECTION, EMULSION INTRAVENOUS at 12:01

## 2020-09-18 RX ADMIN — TRAMADOL HYDROCHLORIDE 50 MG: 50 TABLET, FILM COATED ORAL at 17:31

## 2020-09-18 RX ADMIN — SODIUM CHLORIDE 125 ML/HR: 9 INJECTION, SOLUTION INTRAVENOUS at 17:31

## 2020-09-18 RX ADMIN — SUCCINYLCHOLINE CHLORIDE 140 MG: 20 INJECTION, SOLUTION INTRAMUSCULAR; INTRAVENOUS at 12:02

## 2020-09-18 RX ADMIN — PHENYLEPHRINE HYDROCHLORIDE 200 MCG: 10 INJECTION INTRAVENOUS at 12:01

## 2020-09-18 RX ADMIN — Medication 10 ML: at 17:48

## 2020-09-18 RX ADMIN — PHENYLEPHRINE HYDROCHLORIDE 200 MCG: 10 INJECTION INTRAVENOUS at 12:39

## 2020-09-18 RX ADMIN — PHENYLEPHRINE HYDROCHLORIDE 200 MCG: 10 INJECTION INTRAVENOUS at 12:30

## 2020-09-18 RX ADMIN — SENNOSIDES AND DOCUSATE SODIUM 1 TABLET: 8.6; 5 TABLET ORAL at 23:08

## 2020-09-18 RX ADMIN — FENTANYL CITRATE 100 MCG: 50 INJECTION, SOLUTION INTRAMUSCULAR; INTRAVENOUS at 12:09

## 2020-09-18 RX ADMIN — GABAPENTIN 300 MG: 300 CAPSULE ORAL at 17:31

## 2020-09-18 RX ADMIN — PHENYLEPHRINE HYDROCHLORIDE 200 MCG: 10 INJECTION INTRAVENOUS at 12:25

## 2020-09-18 RX ADMIN — ONDANSETRON 4 MG: 2 INJECTION INTRAMUSCULAR; INTRAVENOUS at 12:26

## 2020-09-18 RX ADMIN — SODIUM CHLORIDE, POTASSIUM CHLORIDE, SODIUM LACTATE AND CALCIUM CHLORIDE: 600; 310; 30; 20 INJECTION, SOLUTION INTRAVENOUS at 12:38

## 2020-09-18 NOTE — ANESTHESIA POSTPROCEDURE EVALUATION
Procedure(s):  Posterior Cervical Wound Incision & Debridement.     general    Anesthesia Post Evaluation      Multimodal analgesia: multimodal analgesia used between 6 hours prior to anesthesia start to PACU discharge  Patient location during evaluation: PACU  Patient participation: complete - patient participated  Level of consciousness: awake  Pain score: 0  Pain management: adequate  Airway patency: patent  Anesthetic complications: no  Cardiovascular status: acceptable  Respiratory status: acceptable  Hydration status: acceptable  Post anesthesia nausea and vomiting:  controlled  Final Post Anesthesia Temperature Assessment:  Normothermia (36.0-37.5 degrees C)      INITIAL Post-op Vital signs:   Vitals Value Taken Time   /68 9/18/2020  2:18 PM   Temp 36.5 °C (97.7 °F) 9/18/2020 12:55 PM   Pulse 97 9/18/2020  2:18 PM   Resp 20 9/18/2020  2:18 PM   SpO2 96 % 9/18/2020  2:18 PM

## 2020-09-18 NOTE — OP NOTES
PREOPERATIVE DIAGNOSIS  Posterior cervical wound dehiscence, possible superficial wound infection    POSTOPERATIVE DIAGNOSIS  Posterior cervical wound dehiscence    PROCEDURE PERFORMED  Incision and debridement posterior cervical wound    SURGEON  Yuri Pearson MD    ASSISTANT  Rock Tucker    ESTIMATED BLOOD LOSS  10 cc    COMPLICATIONS  None    DISPOSITION  Stable to recovery room    INDICATIONS FOR PROCEDURE  Ms. Felisha Cheng is a 60 y.o. woman with persistent drainage from her posterior cervical wound after she underwent posterior instrumented fusion on 8/18/2020 for cervical myelopathy. She had a significant reaction to tape around the incision site but this resolved with local care. Unfortunately, a small portion of the middle of the wound dehisced and had persistent drainage. She was seen in the office in follow-up yesterday after a course of antibiotic but the dehiscence did not resolve and there was some slight erythema. Therefore, I recommended formal incision and debridement to evacuate any underlying purulence. After a thorough discussion of the attendant benefits and risks of surgical intervention the patient consented freely to the procedure. DETAILS OF OPERATIVE INTERVENTION  The patient was identified in the holding area and the operative site was marked. She was taken to the OR and placed in the prone position on the [facility] table after intubation. All bony prominences were adequately padded. Her neck was prepped and draped in the usual sterile fashion. The appropriate time out procedure was called and carried out. The patient received Ancef 2 g IV within 1 hour before incision. The dehisced portion was extended slightly cranially and caudally. There was no purulence encountered. Some of the soft tissue appeared nonviable so this was excised sharply down to the fascia and including the fascia with scissors. The excised material was sent for culture.   The swab was also used to obtain direct tissue from the fascia and subcutaneous region. The fascia was not dehisced so it was not opened. The wound itself was quite clean and viable. After copious irrigation with sterile saline, the wound was reapproximated with 2-0 nylon using vertical mattress stitches. The midportion was left open and packed with the edge of 1 of the gauze sponges which was then used for padding. The gauze pad was then covered with Tegaderm. The patient was turned to the supine position and extubated without incident. She was transported to recovery room in stable condition.

## 2020-09-18 NOTE — ANESTHESIA PREPROCEDURE EVALUATION
Relevant Problems   No relevant active problems       Anesthetic History   No history of anesthetic complications            Review of Systems / Medical History  Patient summary reviewed, nursing notes reviewed and pertinent labs reviewed    Pulmonary        Sleep apnea    Asthma        Neuro/Psych         Psychiatric history    Comments: Depression Anxiety  Ira palsy  Neuropathy  RLS Cardiovascular    Hypertension        Dysrhythmias   Hyperlipidemia         GI/Hepatic/Renal     GERD           Endo/Other    Diabetes: type 2  Hypothyroidism  Morbid obesity, arthritis and cancer     Other Findings   Comments: Breast CA         Physical Exam    Airway  Mallampati: III  TM Distance: 4 - 6 cm  Neck ROM: normal range of motion   Mouth opening: Normal     Cardiovascular    Rhythm: regular  Rate: normal         Dental    Dentition: Poor dentition     Pulmonary  Breath sounds clear to auscultation               Abdominal  Abdominal exam normal       Other Findings            Anesthetic Plan    ASA: 3  Anesthesia type: general          Induction: Intravenous  Anesthetic plan and risks discussed with: Patient

## 2020-09-18 NOTE — NURSE NAVIGATOR
TRANSFER - OUT REPORT:    Verbal report given to Charles Cortes RN on Rina Lung  being transferred to [address] [place] [address] for routine post - op       Report consisted of patients Situation, Background, Assessment and   Recommendations(SBAR). Information from the following report(s) SBAR was reviewed with the receiving nurse. Opportunity for questions and clarification was provided.       Patient transported with:   Registered Nurse

## 2020-09-19 ENCOUNTER — APPOINTMENT (OUTPATIENT)
Dept: GENERAL RADIOLOGY | Age: 65
End: 2020-09-19
Attending: ORTHOPAEDIC SURGERY
Payer: MEDICAID

## 2020-09-19 ENCOUNTER — APPOINTMENT (OUTPATIENT)
Dept: CT IMAGING | Age: 65
End: 2020-09-19
Attending: ORTHOPAEDIC SURGERY
Payer: MEDICAID

## 2020-09-19 LAB
ARTERIAL PATENCY WRIST A: ABNORMAL
BASE EXCESS BLDA CALC-SCNC: 2.6 MMOL/L (ref 0–2)
BDY SITE: ABNORMAL
EPAP/CPAP/PEEP, PAPEEP: 0
FIO2 ON VENT: 21 %
HCO3 BLDA-SCNC: 27 MMOL/L (ref 22–26)
PCO2 BLDA: 41 MMHG (ref 35–45)
PH BLDA: 7.43 [PH] (ref 7.35–7.45)
PO2 BLDA: 86 MMHG (ref 75–100)
SAO2 % BLD: 96 %

## 2020-09-19 PROCEDURE — 74011250637 HC RX REV CODE- 250/637: Performed by: INTERNAL MEDICINE

## 2020-09-19 PROCEDURE — 36600 WITHDRAWAL OF ARTERIAL BLOOD: CPT

## 2020-09-19 PROCEDURE — 74011250636 HC RX REV CODE- 250/636: Performed by: ANESTHESIOLOGY

## 2020-09-19 PROCEDURE — 74011250637 HC RX REV CODE- 250/637: Performed by: ORTHOPAEDIC SURGERY

## 2020-09-19 PROCEDURE — 82803 BLOOD GASES ANY COMBINATION: CPT

## 2020-09-19 PROCEDURE — 99218 HC RM OBSERVATION: CPT

## 2020-09-19 PROCEDURE — 99222 1ST HOSP IP/OBS MODERATE 55: CPT | Performed by: INTERNAL MEDICINE

## 2020-09-19 PROCEDURE — 74011250636 HC RX REV CODE- 250/636: Performed by: INTERNAL MEDICINE

## 2020-09-19 PROCEDURE — 73030 X-RAY EXAM OF SHOULDER: CPT

## 2020-09-19 PROCEDURE — 70450 CT HEAD/BRAIN W/O DYE: CPT

## 2020-09-19 RX ORDER — SODIUM CHLORIDE 0.9 % (FLUSH) 0.9 %
5-40 SYRINGE (ML) INJECTION EVERY 8 HOURS
Status: DISCONTINUED | OUTPATIENT
Start: 2020-09-19 | End: 2020-09-21 | Stop reason: HOSPADM

## 2020-09-19 RX ORDER — HYDROMORPHONE HYDROCHLORIDE 1 MG/ML
0.5 INJECTION, SOLUTION INTRAMUSCULAR; INTRAVENOUS; SUBCUTANEOUS
Status: DISCONTINUED | OUTPATIENT
Start: 2020-09-19 | End: 2020-09-19

## 2020-09-19 RX ORDER — BUTALBITAL, ACETAMINOPHEN AND CAFFEINE 50; 325; 40 MG/1; MG/1; MG/1
2 TABLET ORAL
Status: DISCONTINUED | OUTPATIENT
Start: 2020-09-19 | End: 2020-09-19

## 2020-09-19 RX ORDER — INSULIN LISPRO 100 [IU]/ML
INJECTION, SOLUTION INTRAVENOUS; SUBCUTANEOUS
Status: DISCONTINUED | OUTPATIENT
Start: 2020-09-19 | End: 2020-09-21 | Stop reason: HOSPADM

## 2020-09-19 RX ORDER — MAGNESIUM SULFATE 100 %
4 CRYSTALS MISCELLANEOUS AS NEEDED
Status: DISCONTINUED | OUTPATIENT
Start: 2020-09-19 | End: 2020-09-21 | Stop reason: HOSPADM

## 2020-09-19 RX ORDER — HYDROMORPHONE HYDROCHLORIDE 2 MG/1
2 TABLET ORAL
Status: DISCONTINUED | OUTPATIENT
Start: 2020-09-19 | End: 2020-09-21 | Stop reason: HOSPADM

## 2020-09-19 RX ORDER — DEXTROSE 50 % IN WATER (D50W) INTRAVENOUS SYRINGE
25-50 AS NEEDED
Status: DISCONTINUED | OUTPATIENT
Start: 2020-09-19 | End: 2020-09-21 | Stop reason: HOSPADM

## 2020-09-19 RX ORDER — FENTANYL CITRATE 50 UG/ML
50 INJECTION, SOLUTION INTRAMUSCULAR; INTRAVENOUS
Status: DISCONTINUED | OUTPATIENT
Start: 2020-09-19 | End: 2020-09-19

## 2020-09-19 RX ORDER — BUTALBITAL, ACETAMINOPHEN AND CAFFEINE 50; 325; 40 MG/1; MG/1; MG/1
1 TABLET ORAL
Status: DISCONTINUED | OUTPATIENT
Start: 2020-09-19 | End: 2020-09-19

## 2020-09-19 RX ORDER — SODIUM CHLORIDE 0.9 % (FLUSH) 0.9 %
5-40 SYRINGE (ML) INJECTION AS NEEDED
Status: DISCONTINUED | OUTPATIENT
Start: 2020-09-19 | End: 2020-09-21 | Stop reason: HOSPADM

## 2020-09-19 RX ORDER — ONDANSETRON 2 MG/ML
4 INJECTION INTRAMUSCULAR; INTRAVENOUS AS NEEDED
Status: DISCONTINUED | OUTPATIENT
Start: 2020-09-19 | End: 2020-09-21 | Stop reason: HOSPADM

## 2020-09-19 RX ADMIN — Medication 10 ML: at 08:56

## 2020-09-19 RX ADMIN — OXYBUTYNIN CHLORIDE 5 MG: 5 TABLET ORAL at 08:49

## 2020-09-19 RX ADMIN — DIAZEPAM 5 MG: 5 TABLET ORAL at 19:35

## 2020-09-19 RX ADMIN — PANTOPRAZOLE SODIUM 40 MG: 40 TABLET, DELAYED RELEASE ORAL at 08:48

## 2020-09-19 RX ADMIN — SERTRALINE HYDROCHLORIDE 150 MG: 50 TABLET ORAL at 08:48

## 2020-09-19 RX ADMIN — Medication 10 ML: at 13:00

## 2020-09-19 RX ADMIN — OXYBUTYNIN CHLORIDE 5 MG: 5 TABLET ORAL at 19:40

## 2020-09-19 RX ADMIN — BUPROPION HYDROCHLORIDE 150 MG: 150 TABLET, EXTENDED RELEASE ORAL at 08:48

## 2020-09-19 RX ADMIN — SENNOSIDES AND DOCUSATE SODIUM 1 TABLET: 8.6; 5 TABLET ORAL at 17:43

## 2020-09-19 RX ADMIN — Medication 10 ML: at 14:00

## 2020-09-19 RX ADMIN — ROPINIROLE HYDROCHLORIDE 3 MG: 1 TABLET, FILM COATED ORAL at 19:40

## 2020-09-19 RX ADMIN — ANASTROZOLE 1 MG: 1 TABLET, COATED ORAL at 11:27

## 2020-09-19 RX ADMIN — LEVOTHYROXINE SODIUM 125 MCG: 0.03 TABLET ORAL at 08:50

## 2020-09-19 RX ADMIN — MULTIVITAMIN TABLET 1 TABLET: TABLET at 08:50

## 2020-09-19 RX ADMIN — VANCOMYCIN HYDROCHLORIDE 1500 MG: 1.5 INJECTION, POWDER, LYOPHILIZED, FOR SOLUTION INTRAVENOUS at 16:43

## 2020-09-19 RX ADMIN — LORATADINE 10 MG: 10 TABLET ORAL at 11:27

## 2020-09-19 RX ADMIN — BUTALBITAL, ACETAMINOPHEN AND CAFFEINE 1 TABLET: 50; 325; 40 TABLET ORAL at 13:21

## 2020-09-19 RX ADMIN — GABAPENTIN 300 MG: 300 CAPSULE ORAL at 08:48

## 2020-09-19 RX ADMIN — Medication 10 ML: at 19:36

## 2020-09-19 RX ADMIN — ONDANSETRON 4 MG: 2 INJECTION INTRAMUSCULAR; INTRAVENOUS at 19:34

## 2020-09-19 RX ADMIN — SENNOSIDES AND DOCUSATE SODIUM 1 TABLET: 8.6; 5 TABLET ORAL at 08:49

## 2020-09-19 RX ADMIN — TRAMADOL HYDROCHLORIDE 50 MG: 50 TABLET, FILM COATED ORAL at 19:34

## 2020-09-19 RX ADMIN — ATORVASTATIN CALCIUM 20 MG: 20 TABLET, FILM COATED ORAL at 08:49

## 2020-09-19 RX ADMIN — OXYBUTYNIN CHLORIDE 5 MG: 5 TABLET ORAL at 17:43

## 2020-09-19 RX ADMIN — GABAPENTIN 300 MG: 300 CAPSULE ORAL at 17:43

## 2020-09-19 NOTE — PROGRESS NOTES
Dr. Liliana Poole was contacted because pt was still stating that her headache was serve and pt was crying. Dr. Liliana Poole ordered for the hospitalist to be consulted. Orders received and entered. Will continue to monitor.

## 2020-09-19 NOTE — PROGRESS NOTES
Dr. Margie Salmeron was contacted because pt was stating she had a severe headache. Pt was given Fioricet and pt stated her pain went from 10 to a 7 . Dr. Margie Salmeron order for Fioricet to be changed from 1 tablet to 2. Orders received and entered. Will continue to monitor.

## 2020-09-19 NOTE — PROGRESS NOTES
Dr. Magy Chaney was contacted because pt was still complaining of a severe headache. Dr. Magy Chaney ordered a consult to the Hospitalist. Orders received and entered. Will continue to monitor.

## 2020-09-19 NOTE — PROGRESS NOTES
Received orders from Dr. Hiren Bertrand to order a STAT CT of the head. Orders received and entered. Will continue to monitor.

## 2020-09-19 NOTE — CONSULTS
Infectious Disease Consult Note    Reason for Consult: Postoperative wound infection  Date of Consultation: September 19, 2020  Date of Admission: 9/18/2020  Referring Physician:  Dr Tam Standard    HPI:  64-y.o WF admitted on 9/18 after undergoing debridement of her cervical spine for post-op infection. She underwent posterior instrumented fusion of her cervical spine on 8/18 for cervical myelopathy by . Pt reports doing well post-op but noted some bloody drainage from incisional wound. She was seen as outpt by her orthopedic surgeon on 09/18 and referred to the hospital for wound debridement in OR. She has been afebrile since presentation. Intraoperative Cx  from 9/18 is pending. She denies a h/o MRSA infection/colonization. She has not received any postoperative antibiotics. Pt reported headache during my assessment.      Review of Systems:     Gen: Negative for chills, fevers, weight loss, weight gain   HEENT: Negative for headache, vision changes, ear ache or discharge   CV:  Negative for chest pain, dyspnea on exertion, leg edema   Lungs: Negative for shortness of breath, cough, wheezing   Abdomen: Negative for abdominal pain, nausea, vomiting, diarrhea, constipation     Neuro:  headache, numbness, tingling, extremity weakness   Skin: cervical spine dressing in place    Musculoskeletal: Negative for joint pain, joint swelling, joint erythema    Endocrine: Negative for high or low blood sugars, heat or cold intolerance    Psych: Negative for manic behavior       Past Medical History:  Past Medical History:   Diagnosis Date    Anxiety     Arrhythmia     pt states irreg heartbeat occ    Arthritis     Asthma     Bell's palsy 1979    Breast cancer ([facility] [address] 2014    left idc    Burning with urination     neurogenic bladder    Cancer ([facility] [address] 4/2014    breast CA-left    Chronic pain     knees, back, hands,     Depression     attempted overdose 2010, denies any current suicidal ideation    Depression     Diabetes ([facility] [address]     GERD (gastroesophageal reflux disease)     Hypercholesterolemia     Hypertension     Morbid obesity ([facility] [address]     Neck pain     Numbness and tingling in both hands     Open wound     pt states neck incision back of neck draining     Radiation therapy complication 6530    idc    Restless leg syndrome     Thyroid disease     hypothyroid    Unspecified sleep apnea     CPAP       Past surgical history   Past Surgical History:   Procedure Laterality Date    HX BREAST BIOPSY  5/28/2014    LEFT    HX BREAST BIOPSY Left 10/20/2014    LEFT BREAST EXCISION OF NON HEALING WOUND performed by Monica Swanson MD at [place] [address] HX BREAST BIOPSY Left 2017    neg    HX BREAST LUMPECTOMY Left 5/28/2014    LEFT, stage 1    HX CERVICAL FUSION      pt states approx 1 month ago    HX DILATION AND CURETTAGE      HX GASTRIC BYPASS      pt states 2018    HX ORTHOPAEDIC      neck surgery     HX OTHER SURGICAL  July 2014    Portacath Insertion    HX VASCULAR ACCESS      portacath in 7/2014, removed 9/2014        Allergies: Allergies   Allergen Reactions    Adhesive Tape-Silicones Contact Dermatitis         Medications:  No current facility-administered medications on file prior to encounter. Current Outpatient Medications on File Prior to Encounter   Medication Sig Dispense Refill    multivitamin (ONE A DAY) tablet Take 1 Tab by mouth daily. Gummie      OTHER Hair Skin and Nail Vitamin 1 oral gummie      atorvastatin (LIPITOR) 20 mg tablet Take 1 Tab by mouth daily.  gabapentin (NEURONTIN) 300 mg capsule Take 1 Cap by mouth two (2) times a day.  sertraline (ZOLOFT) 50 mg tablet TAKE 3 TABLETS BY MOUTH ONCE DAILY IN THE MORNING .  APPOINTMENT REQUIRED FOR FUTURE REFILLS 270 Tab 0    anastrozole (ARIMIDEX) 1 mg tablet TAKE 1 TABLET BY MOUTH ONCE DAILY 90 Tab 3    buPROPion SR (WELLBUTRIN SR) 150 mg SR tablet       MYRBETRIQ 50 mg ER tablet       pantoprazole (PROTONIX) 40 mg tablet Take 40 mg by mouth daily.  rOPINIRole (REQUIP) 3 mg tab tab       CALCIUM CARBONATE (CALCIUM 600 PO) Take  by mouth.  diazePAM (VALIUM) 5 mg tablet Take 5 mg by mouth daily. As needed      levothyroxine (SYNTHROID) 100 mcg tablet Take 125 mcg by mouth Daily (before breakfast).  rOPINIRole (REQUIP) 2 mg tablet Take  by mouth nightly.  cetirizine (ZYRTEC) 10 mg tablet Take  by mouth daily.  loratadine (CLARITIN) 10 mg tablet              Physical Exam:    Vitals:   Patient Vitals for the past 24 hrs:   Temp Pulse Resp BP SpO2   09/19/20 1135 98.1 °F (36.7 °C) 72 20 115/69 95 %   09/19/20 0747 98.3 °F (36.8 °C) 75 20 106/70 99 %   09/19/20 0219 97.5 °F (36.4 °C) 74 20 133/81 98 %   09/18/20 2039 98.4 °F (36.9 °C) 78 18 105/67 93 %   09/18/20 1748 98.9 °F (37.2 °C) (!) 101 18 107/73 98 %   09/18/20 1700 99.1 °F (37.3 °C) 99 18 119/67 98 %   09/18/20 1620 98.9 °F (37.2 °C) (!) 101 18 109/67 96 %   09/18/20 1605 -- 100 19 108/62 96 %   09/18/20 1530 -- 96 18 (!) 132/58 98 %   09/18/20 1418 -- 97 20 131/68 96 %   09/18/20 1325 -- 93 15 (!) 102/90 97 %   09/18/20 1305 -- 87 21 (!) 97/52 98 %   09/18/20 1300 -- 97 20 105/86 97 %   09/18/20 1256 -- 92 16 (!) 124/97 94 %   09/18/20 1255 97.7 °F (36.5 °C) 92 16 (!) 124/97 94 %   ·   · GEN: NAD, AAO x 4  · HEENT: Normocephalic, atraumatic, cervical spine brace in place   · HEART: S1, S2+, RRR, No murmur   · Lungs: No wheeze or rhonchi  · Abdomen: soft, ND, NT, +BS   · Genitourinary: no genital discharge, no covington  · Extremities: Limited motion in RUE, able to left LUE over shoulder   · Skin: Cervical spine dressing in place    Labs:   No results found for this or any previous visit (from the past 24 hour(s)).     Microbiology Data:  Results     Procedure Component Value Units Date/Time    CULTURE, Melissa Vogel STAIN [[id number]] Collected:  09/18/20 1245    Order Status:  Completed Specimen:  Wound Drainage Updated:  09/18/20 9783 Assessment / Plan:     1. Postoperative cervical spine infection/wound dehiscence following posterior instrumented fusion on 8/18/2020       S/p wound debridement on 9/18 per chart documentations wound appears to be superficial, w/o deep tissue or hardware involvement       Intraoperative Cxs are pending. No h/o MRSA infection/colonization       We will examine wound with next dressing change to determine need for IV Cx oral antibiotic       Start on Vanc and Zosyn pending intraoperative Cx        CBC, CRP and ESR w morning labs. MRSA screen       2. Cervical myelopathy: S/p posterior instrumented fusion on 8/18/2020      Improved ROM in b/l UEs, L>R    3. Headache: Start on Fioricet, monitor for symptomatic improvement     4.  Other chronic problems per HPI    Jayme Cruz MD     9/19/2020

## 2020-09-19 NOTE — PROGRESS NOTES
Orthopaedic Progress Note  Post Op day: 1 Day Post-Op    2020 4:42 PM     Patient: Loly Myers MRN: [id number]  SSN: [id number]    YOB: 1955  Age: 60 y.o. Sex: female      Admit date:  2020  Date of Surgery:  2020   Procedures:  Procedure(s):  Posterior Cervical Wound Incision & Debridement  Admitting Physician:  Tamiko Silva MD   Surgeon:  Kenn Suresh) and Role:     Remi Erickson MD - Primary    Consulting Physician(s): Treatment Team: Attending Provider: Viraj Espinal MD; Consulting Provider: Daisha Zhang MD; Primary Nurse: Beverly Whitmore RN    SUBJECTIVE:     Loly Myers is a 60 y.o. female is 1 Day Post-Op s/p Procedure(s):  Posterior Cervical Wound Incision & Debridement with an appropriate level of post-operative pain. No complaints of nausea, vomiting, dizziness, lightheadedness, chest pain, or shortness of breath. Patient does complain of headache as well as right shoulder pain. OBJECTIVE:       Physical Exam:  General: Alert, cooperative, no distress. Respiratory: Respirations unlabored  Neurological:  Neurovascular exam within normal limits. Motor: + DF/PF.    Musculoskeletal: Moves all, painful and limited ROM right shoulder    Vital Signs:        Patient Vitals for the past 8 hrs:   BP Temp Pulse Resp SpO2   20 1519 -- -- -- -- 96 %   20 1135 115/69 98.1 °F (36.7 °C) 72 20 95 %                                          Temp (24hrs), Av.4 °F (36.9 °C), Min:97.5 °F (36.4 °C), Max:99.1 °F (37.3 °C)      Labs:        Recent Labs     20  0925   HCT 36.8   HGB 11.9     Lab Results   Component Value Date/Time    Sodium 140 2020 09:25 AM    Potassium 4.8 2020 09:25 AM    Chloride 107 2020 09:25 AM    CO2 29 2020 09:25 AM    Glucose 84 2020 09:25 AM    BUN 22 (H) 2020 09:25 AM    Creatinine 0.76 2020 09:25 AM    Calcium 9.4 2020 09:25 AM PT/OT:                Patient mobility                         ASSESSMENT / PLAN:   Active Problems:    Postoperative infection (9/18/2020)      Surgical wound dehiscence (9/18/2020)         S/P Posterior Cervical Wound Incision & Debridement Continue antibiotics per ID recommendations. DVT Prophylaxis SCDs   Pain Continue current regimen   Discharge Disposition Home w/ Family when cleared by therapy.      Signed By: Desiree Lopez MD

## 2020-09-20 LAB
ALBUMIN SERPL-MCNC: 2.8 G/DL (ref 3.5–5)
ALBUMIN/GLOB SERPL: 0.8 {RATIO} (ref 1.1–2.2)
ALP SERPL-CCNC: 93 U/L (ref 45–117)
ALT SERPL-CCNC: 17 U/L (ref 12–78)
ANION GAP SERPL CALC-SCNC: 7 MMOL/L (ref 5–15)
AST SERPL W P-5'-P-CCNC: 15 U/L (ref 15–37)
BASOPHILS # BLD: 0 K/UL (ref 0–0.1)
BASOPHILS NFR BLD: 0 % (ref 0–1)
BILIRUB SERPL-MCNC: 0.3 MG/DL (ref 0.2–1)
BUN SERPL-MCNC: 14 MG/DL (ref 6–20)
BUN/CREAT SERPL: 19 (ref 12–20)
CA-I BLD-MCNC: 9 MG/DL (ref 8.5–10.1)
CHLORIDE SERPL-SCNC: 107 MMOL/L (ref 97–108)
CO2 SERPL-SCNC: 28 MMOL/L (ref 21–32)
CREAT SERPL-MCNC: 0.73 MG/DL (ref 0.55–1.02)
CRP SERPL-MCNC: <0.29 MG/DL (ref 0–0.6)
DIFFERENTIAL METHOD BLD: NORMAL
EOSINOPHIL # BLD: 0 K/UL (ref 0–0.4)
EOSINOPHIL NFR BLD: 0 % (ref 0–7)
ERYTHROCYTE [DISTWIDTH] IN BLOOD BY AUTOMATED COUNT: 12.9 % (ref 11.5–14.5)
ERYTHROCYTE [SEDIMENTATION RATE] IN BLOOD: 24 MM/HR
GLOBULIN SER CALC-MCNC: 3.4 G/DL (ref 2–4)
GLUCOSE BLD STRIP.AUTO-MCNC: 123 MG/DL (ref 65–100)
GLUCOSE BLD STRIP.AUTO-MCNC: 92 MG/DL (ref 65–100)
GLUCOSE BLD STRIP.AUTO-MCNC: 96 MG/DL (ref 65–100)
GLUCOSE BLD STRIP.AUTO-MCNC: 96 MG/DL (ref 65–100)
GLUCOSE SERPL-MCNC: 86 MG/DL (ref 65–100)
HCT VFR BLD AUTO: 37.2 % (ref 35–47)
HGB BLD-MCNC: 12 % (ref 11.5–16)
IMM GRANULOCYTES # BLD AUTO: 0 K/UL (ref 0–0.04)
IMM GRANULOCYTES NFR BLD AUTO: 0 % (ref 0–0.5)
LYMPHOCYTES # BLD: 2.3 K/UL (ref 0.8–3.5)
LYMPHOCYTES NFR BLD: 37 % (ref 12–49)
MCH RBC QN AUTO: 29 PG (ref 26–34)
MCHC RBC AUTO-ENTMCNC: 32.3 G/DL (ref 30–36.5)
MCV RBC AUTO: 89.9 FL (ref 80–99)
MONOCYTES # BLD: 0.5 K/UL (ref 0–1)
MONOCYTES NFR BLD: 7 % (ref 5–13)
NEUTS SEG # BLD: 3.5 K/UL (ref 1.8–8)
NEUTS SEG NFR BLD: 56 % (ref 32–75)
PERFORMED BY, TECHID: ABNORMAL
PERFORMED BY, TECHID: NORMAL
PLATELET # BLD AUTO: 290 K/UL (ref 150–400)
PMV BLD AUTO: 9.1 FL (ref 8.9–12.9)
POTASSIUM SERPL-SCNC: 3.6 MMOL/L (ref 3.5–5.1)
PROT SERPL-MCNC: 6.2 G/DL (ref 6.4–8.2)
RBC # BLD AUTO: 4.14 M/UL (ref 3.8–5.2)
SODIUM SERPL-SCNC: 142 MMOL/L (ref 136–145)
TSH SERPL DL<=0.05 MIU/L-ACNC: 0.38 UIU/ML (ref 0.36–3.74)
WBC # BLD AUTO: 6.3 K/UL (ref 3.6–11)

## 2020-09-20 PROCEDURE — 74011250636 HC RX REV CODE- 250/636: Performed by: INTERNAL MEDICINE

## 2020-09-20 PROCEDURE — 74011000258 HC RX REV CODE- 258: Performed by: INTERNAL MEDICINE

## 2020-09-20 PROCEDURE — 99218 HC RM OBSERVATION: CPT

## 2020-09-20 PROCEDURE — 87077 CULTURE AEROBIC IDENTIFY: CPT

## 2020-09-20 PROCEDURE — 87186 SC STD MICRODIL/AGAR DIL: CPT

## 2020-09-20 PROCEDURE — 74011250637 HC RX REV CODE- 250/637: Performed by: NURSE PRACTITIONER

## 2020-09-20 PROCEDURE — 74011250637 HC RX REV CODE- 250/637: Performed by: HOSPITALIST

## 2020-09-20 PROCEDURE — 87086 URINE CULTURE/COLONY COUNT: CPT

## 2020-09-20 PROCEDURE — 82962 GLUCOSE BLOOD TEST: CPT

## 2020-09-20 PROCEDURE — 84443 ASSAY THYROID STIM HORMONE: CPT

## 2020-09-20 PROCEDURE — 80053 COMPREHEN METABOLIC PANEL: CPT

## 2020-09-20 PROCEDURE — 85025 COMPLETE CBC W/AUTO DIFF WBC: CPT

## 2020-09-20 PROCEDURE — 74011250637 HC RX REV CODE- 250/637: Performed by: ORTHOPAEDIC SURGERY

## 2020-09-20 PROCEDURE — 86140 C-REACTIVE PROTEIN: CPT

## 2020-09-20 PROCEDURE — 85652 RBC SED RATE AUTOMATED: CPT

## 2020-09-20 PROCEDURE — 99232 SBSQ HOSP IP/OBS MODERATE 35: CPT | Performed by: INTERNAL MEDICINE

## 2020-09-20 PROCEDURE — 94664 DEMO&/EVAL PT USE INHALER: CPT

## 2020-09-20 RX ORDER — POTASSIUM CHLORIDE 750 MG/1
40 TABLET, FILM COATED, EXTENDED RELEASE ORAL ONCE
Status: COMPLETED | OUTPATIENT
Start: 2020-09-20 | End: 2020-09-20

## 2020-09-20 RX ORDER — HYDROXYZINE PAMOATE 25 MG/1
25 CAPSULE ORAL
Status: DISCONTINUED | OUTPATIENT
Start: 2020-09-20 | End: 2020-09-21 | Stop reason: HOSPADM

## 2020-09-20 RX ADMIN — OXYBUTYNIN CHLORIDE 5 MG: 5 TABLET ORAL at 17:47

## 2020-09-20 RX ADMIN — Medication 10 ML: at 00:21

## 2020-09-20 RX ADMIN — POTASSIUM CHLORIDE 40 MEQ: 750 TABLET, FILM COATED, EXTENDED RELEASE ORAL at 11:44

## 2020-09-20 RX ADMIN — GABAPENTIN 300 MG: 300 CAPSULE ORAL at 08:41

## 2020-09-20 RX ADMIN — ANASTROZOLE 1 MG: 1 TABLET, COATED ORAL at 09:29

## 2020-09-20 RX ADMIN — ATORVASTATIN CALCIUM 20 MG: 20 TABLET, FILM COATED ORAL at 08:42

## 2020-09-20 RX ADMIN — PANTOPRAZOLE SODIUM 40 MG: 40 TABLET, DELAYED RELEASE ORAL at 08:42

## 2020-09-20 RX ADMIN — DIAZEPAM 5 MG: 5 TABLET ORAL at 08:42

## 2020-09-20 RX ADMIN — HYDROMORPHONE HYDROCHLORIDE 2 MG: 2 TABLET ORAL at 17:46

## 2020-09-20 RX ADMIN — LORATADINE 10 MG: 10 TABLET ORAL at 08:41

## 2020-09-20 RX ADMIN — LEVOTHYROXINE SODIUM 125 MCG: 0.03 TABLET ORAL at 08:41

## 2020-09-20 RX ADMIN — SERTRALINE HYDROCHLORIDE 150 MG: 50 TABLET ORAL at 08:41

## 2020-09-20 RX ADMIN — SENNOSIDES AND DOCUSATE SODIUM 1 TABLET: 8.6; 5 TABLET ORAL at 17:38

## 2020-09-20 RX ADMIN — HYDROXYZINE PAMOATE 25 MG: 25 CAPSULE ORAL at 22:42

## 2020-09-20 RX ADMIN — OXYBUTYNIN CHLORIDE 5 MG: 5 TABLET ORAL at 22:42

## 2020-09-20 RX ADMIN — Medication 10 ML: at 22:43

## 2020-09-20 RX ADMIN — OXYBUTYNIN CHLORIDE 5 MG: 5 TABLET ORAL at 08:42

## 2020-09-20 RX ADMIN — MULTIVITAMIN TABLET 1 TABLET: TABLET at 08:42

## 2020-09-20 RX ADMIN — HYDROMORPHONE HYDROCHLORIDE 2 MG: 2 TABLET ORAL at 02:33

## 2020-09-20 RX ADMIN — CEFTRIAXONE SODIUM 2 G: 2 INJECTION, POWDER, FOR SOLUTION INTRAMUSCULAR; INTRAVENOUS at 11:31

## 2020-09-20 RX ADMIN — SENNOSIDES AND DOCUSATE SODIUM 1 TABLET: 8.6; 5 TABLET ORAL at 08:41

## 2020-09-20 RX ADMIN — ROPINIROLE HYDROCHLORIDE 3 MG: 1 TABLET, FILM COATED ORAL at 22:42

## 2020-09-20 RX ADMIN — GABAPENTIN 300 MG: 300 CAPSULE ORAL at 17:36

## 2020-09-20 RX ADMIN — VANCOMYCIN HYDROCHLORIDE 1500 MG: 1.5 INJECTION, POWDER, LYOPHILIZED, FOR SOLUTION INTRAVENOUS at 17:38

## 2020-09-20 NOTE — CONSULTS
Consult Date: 9/19/2020    IP CONSULT TO HOSPITALIST  Consult performed by: Mary Kate Mckeon MD  Consult ordered by: Johnathan Carvajal MD  Reason for consult: Headache, medical management      Chief Complaint : Post operative wound infection. Source of information : patient is good historian, medical records. History of present illness :  60year old female with history of gastric bypass surgery and recent cervical spine surgery directly admitted due to post operative infection of the incision, had debridement done under anaesthesia. Started on antibiotics, wound culture sent. She is complaining of significant headache, feels like tightness in the head cannot relax. Denies any vision disturbance, photophobia. She had little nausea but no vomiting. Denies any fever or chills. States that she has significant weakness in the lower extremities with inability to ambulate before surgery, since surgery with physical therapy she was able to ambulate in the house. She has incontinence of urine, and urinating a lot. Denies any dysuria but she does not feel any sensation in the pelvic area.     Past Medical History:   Diagnosis Date    Anxiety     Arrhythmia     pt states irreg heartbeat occ    Arthritis     Asthma     Bell's palsy 1979    Breast cancer ([address] 2014    left idc    Cancer ([address] 4/2014    breast CA-left    Chronic pain     knees, back, hands,     Depression     attempted overdose 2010, denies any current suicidal ideation    Diabetes ([address]     GERD (gastroesophageal reflux disease)     Hypercholesterolemia     Hypertension     Morbid obesity ([address]     Numbness and tingling in both hands     Open wound     pt states neck incision back of neck draining     Radiation therapy complication 9140    idc    Restless leg syndrome     Thyroid disease     hypothyroid    Unspecified sleep apnea     CPAP      Past Surgical History:   Procedure Laterality Date    HX BREAST BIOPSY 2014    LEFT    HX BREAST BIOPSY Left 10/20/2014    LEFT BREAST EXCISION OF NON HEALING WOUND performed by Marcia Marroquin MD at [place] [address] HX BREAST BIOPSY Left     neg    HX BREAST LUMPECTOMY Left 2014    LEFT, stage 1    HX CERVICAL FUSION      pt states approx 1 month ago    HX DILATION AND CURETTAGE      HX GASTRIC BYPASS      pt states 2018    HX VASCULAR ACCESS      portacath in 2014, removed 2014     Family History   Problem Relation Age of Onset    Cancer Mother 79        lung cancer    Diabetes Mother     Heart Disease Mother     Stroke Mother     Heart Disease Father     Stroke Father     Diabetes Brother     Heart Disease Brother     Cancer Maternal Grandmother     Heart Disease Maternal Grandmother     Stroke Maternal Grandmother     Cancer Maternal Grandfather     Heart Disease Maternal Grandfather     Cancer Paternal Grandmother     Heart Disease Paternal Grandmother     Cancer Paternal Grandfather     Heart Disease Paternal Grandfather       Social History     Tobacco Use    Smoking status: Former Smoker     Packs/day: 3.00     Years: 20.00     Pack years: 60.00     Last attempt to quit: 1998     Years since quittin.3    Smokeless tobacco: Never Used    Tobacco comment: pt sates quit 30 years ago   Substance Use Topics    Alcohol use: No       Current Facility-Administered Medications   Medication Dose Route Frequency Provider Last Rate Last Dose    sodium chloride (NS) flush 5-40 mL  5-40 mL IntraVENous Q8H Raymon Meyer MD   10 mL at 20 1400    sodium chloride (NS) flush 5-40 mL  5-40 mL IntraVENous PRN Savannah Dunham MD   10 mL at 20 1400    ondansetron (ZOFRAN) injection 4 mg  4 mg IntraVENous PRN Savannah Dunham MD   4 mg at 20 1934    cefTRIAXone (ROCEPHIN) 2 g in 0.9% sodium chloride (MBP/ADV) 50 mL MBP  2 g IntraVENous Q24H Nathen Veloz MD        VANCOMYCIN INFORMATION NOTE   Other PRN Willa Velez MD        [START ON 9/20/2020] vancomycin (VANCOCIN) 1,500 mg in 0.9% sodium chloride 250 mL (VIAL-MATE)_  1,500 mg IntraVENous Q24H Willa Velez MD        [START ON 9/21/2020] VANCOMYCIN TROUGH LAB   Other ONCE Willa Velez MD        insulin lispro (HUMALOG) injection   SubCUTAneous AC&HS Tameka Gracia MD        glucose chewable tablet 16 g  4 Tab Oral PRN Tameka Night, MD        dextrose (D50W) injection syrg 12.5-25 g  25-50 mL IntraVENous PRN Tameka Gracia MD        glucagon (GLUCAGEN) injection 1 mg  1 mg IntraMUSCular PRN Tameka Night, MD        HYDROmorphone (DILAUDID) tablet 2 mg  2 mg Oral Q6H PRN Tameka Night, MD        anastrozole (ARIMIDEX) tablet 1 mg  1 mg Oral DAILY Pete Davey MD   1 mg at 09/19/20 1127    atorvastatin (LIPITOR) tablet 20 mg  20 mg Oral DAILY Pete Davey MD   20 mg at 09/19/20 0849    buPROPion XL (WELLBUTRIN XL) tablet 150 mg  150 mg Oral DAILY Pete Davey MD   150 mg at 09/19/20 0848    loratadine (CLARITIN) tablet 10 mg  10 mg Oral DAILY Pete Davey MD   10 mg at 09/19/20 1127    diazePAM (VALIUM) tablet 5 mg  5 mg Oral DAILY Pete Davey MD   5 mg at 09/19/20 1935    gabapentin (NEURONTIN) capsule 300 mg  300 mg Oral BID Pete Davey MD   300 mg at 09/19/20 1743    levothyroxine (SYNTHROID) tablet 125 mcg  125 mcg Oral ACB Pete Davey MD   125 mcg at 09/19/20 0850    multivitamin (ONE A DAY) tablet 1 Tab  1 Tab Oral DAILY Pete Davey MD   1 Tab at 09/19/20 0850    oxybutynin (DITROPAN) tablet 5 mg  5 mg Oral TID Pete Davey MD   5 mg at 09/19/20 1940    pantoprazole (PROTONIX) tablet 40 mg  40 mg Oral DAILY Pete Davey MD   40 mg at 09/19/20 0848    sertraline (ZOLOFT) tablet 150 mg  150 mg Oral DAILY Pete Davey MD   150 mg at 09/19/20 0848    rOPINIRole (REQUIP) tablet 3 mg  3 mg Oral QHS Janet Lutz MD   3 mg at 09/19/20 1940    sodium chloride (NS) flush 5-40 mL  5-40 mL IntraVENous Q8H Janet Lutz MD   10 mL at 09/19/20 1936    sodium chloride (NS) flush 5-40 mL  5-40 mL IntraVENous PRN Janet Lutz MD   10 mL at 09/18/20 1748    naloxone (NARCAN) injection 0.4 mg  0.4 mg IntraVENous PRN Janet Lutz MD        senna-docusate (PERICOLACE) 8.6-50 mg per tablet 1 Tab  1 Tab Oral BID Janet Lutz MD   1 Tab at 09/19/20 1743    polyethylene glycol (MIRALAX) packet 17 g  17 g Oral DAILY Janet Lutz MD       Rafa Rich ON 9/20/2020] bisacodyL (DULCOLAX) suppository 10 mg  10 mg Rectal DAILY PRN Janet Lutz MD            Review of Systems     Constitutional: Appetite is okay, denies weight loss, no fever, no chills, no night sweats  Eye: No recent visual disturbances, no discharge, no double vision  Ear/nose/mouth/throat : No hearing disturbance, no ear pain, no nasal congestion, ++ sore throat, no trouble swallowing. Respiratory : No trouble breathing, no cough, no hemoptysis, no wheezing  Cardiovascular : No chest pain, no palpitation, no racing of heart, no orthopnea,   Gastrointestinal : No nausea, no vomiting, no diarrhea, +constipation, heartburn, abdominal pain  Genitourinary :  ++ increased frequency, ++incontinence,  Lymphatics : She is unable to answer  Endocrine : No excessive thirst, no polyuria no cold intolerance, no heat intolerance. Immunologic : No hives, urticaria, no seasonal allergies,  Musculoskeletal : No joint swelling or pain, No effusion,  ++ back pain, ++ neck pain. Integumentary : No rash, no pruritus, no ecchymosis  Hematology : No petechiae, No easy bruising,  No tendency to bleed easy  Neurology : Denies change in mental status, no abnormal balance, +++ headache, no confusion.   Psychiatric : ++ anxiety, No depression      Objective     Vital signs for last 24 hours:  Visit Vitals  /67 (BP 1 Location: Right arm, BP Patient Position: At rest)   Pulse 83   Temp 98.5 °F (36.9 °C)   Resp 20   Ht 5' (1.524 m)   Wt 97.1 kg (214 lb)   SpO2 95%   BMI 41.79 kg/m²       Intake/Output this shift:  Current Shift: No intake/output data recorded. Last 3 Shifts: 09/18 0701 - 09/19 1900  In: 1000 [I.V.:1000]  Out: 1200 [Urine:1200]    Data Review:   No results found for this or any previous visit (from the past 24 hour(s)). Physical Exam :  General : Looks very tired and weak., no respiratory distress noted  HEENT : PERRLA, normal oral mucosa, atraumatic normocephalic. Ear nose appears normal.  Neck : Supple, no JVD, no masses noted, no carotid bruit  Lungs : Breath sounds with moderate air entry bilaterally, not taking deep breaths. No wheezes or rales, no accessory muscle use,  CVS : Rhythm rate regular, S1+, S2+, no murmur or gallop  Abdomen : Soft, nontender, no organomegaly, bowel sounds active  Extremities : No edema noted,  pedal pulses not palpable  Musculoskeletal : Did not examine range of motion, no joint swelling or effusion, muscle tone and power appears fair. Skin : Dry, poor skin turgor, no pathological rash  Lymphatic : No cervical lymphadenopathy. Neurological : Awake, alert, oriented to time place person. Psychiatric : Mood and affect appears depressed and anxious. Assessment and plan :  Headache: Looks like stress related from surgery. Fioricet and tramadol is on not helping. I will give a dose of hydromorphone 2 mg, ordered an ice pack to keep her the head. This patient has obstructive sleep apnea uses CPAP machine, I will get a ABG to make sure she is not retaining CO2 because of the headache. Postoperative cervical spine infection: Status post wound debridement as it did not improve with outpatient antibiotics, now on IV antibiotics.     Benign essential hypertension: She is hypotensive at this time, will hold medications    History of diabetes but improved after she had gastric bypass surgery done.    Cervical myelopathy with gait disturbance and incontinence of urine, with posterior cervical instrumented fusion on 8/18/2020 with improvement in her lower extremity weakness. Thank you for the consultation, our day team will follow up with the patient.   Any questions please do not hesitate to call us back

## 2020-09-20 NOTE — PROGRESS NOTES
Orthopaedic Progress Note  Post Op day: 2 Days Post-Op    2020 1:27 PM     Patient: Kirti Doherty MRN: [id number]  SSN: [id number]    YOB: 1955  Age: 60 y.o. Sex: female      Admit date:  2020  Date of Surgery:  2020   Procedures:  Procedure(s):  Posterior Cervical Wound Incision & Debridement  Admitting Physician:  Osvaldo Ho MD   Surgeon:  Karla Sawyer) and Role:     Sudhakar Ivory MD - Primary    Consulting Physician(s): Treatment Team: Attending Provider: Odilia Stacy MD; Consulting Provider: Cabrera Dias MD; Consulting Provider: Kervin Marquez MD; Nurse Practitioner: Francine Boas, NP; Primary Nurse: Ronna Ko    SUBJECTIVE:     Kirti Doherty is a 60 y.o. woman is 2 Days Post-Op s/p Procedure(s):  Posterior Cervical Wound Incision & Debridement with an appropriate level of post-operative pain. Headache has now resolved. Doing well with pain well controlled. OBJECTIVE:       Physical Exam:  General: Alert, cooperative, no distress. Respiratory: Respirations unlabored  Neurological:  Neurovascular exam within normal limits. Motor: + DF/PF. Musculoskeletal: Calves soft, supple, non-tender upon palpation. Dressing/Wound:  Clean, dry and packing intact. No significant erythema or swelling. Vital Signs:      No data found.                                        Temp (24hrs), Av.7 °F (37.1 °C), Min:98.5 °F (36.9 °C), Max:98.8 °F (37.1 °C)      Labs:        Recent Labs     20  0925   HCT 36.8   HGB 11.9     Lab Results   Component Value Date/Time    Sodium 142 2020 04:00 AM    Potassium 3.6 2020 04:00 AM    Chloride 107 2020 04:00 AM    CO2 28 2020 04:00 AM    Glucose 86 2020 04:00 AM    BUN 14 2020 04:00 AM    Creatinine 0.73 2020 04:00 AM    Calcium 9.0 2020 04:00 AM       PT/OT:                Patient mobility                         ASSESSMENT / PLAN: Active Problems:    Postoperative infection (9/18/2020)      Surgical wound dehiscence (9/18/2020)     Progressing as expected. Awaiting wound cultures. Appreciate ID input. S/P Posterior Cervical Wound Incision & Debridement PT/OT/Rehab. Patient understands and agrees with plan of care.     Signed By: Inocente Galvez MD

## 2020-09-20 NOTE — BH NOTES
Pt admitted under OBS designation on [id number].   Paged Dr Ronni Conklin [phone] as covering physician for poc/loc discussion

## 2020-09-20 NOTE — PROGRESS NOTES
Infectious Disease Progress Note               Subjective:   Pt seen and examined at bedside. Headache if much better today. Reports urinary incontinence     Objective:   Physical Exam:     Visit Vitals  /68 (BP 1 Location: Left arm, BP Patient Position: At rest)   Pulse 98   Temp 98.7 °F (37.1 °C)   Resp 18   Ht 5' (1.524 m)   Wt 97.1 kg (214 lb)   SpO2 100%   BMI 41.79 kg/m²      O2 Device: Room air    Temp (24hrs), Av.7 °F (37.1 °C), Min:98.5 °F (36.9 °C), Max:98.8 °F (37.1 °C)    No intake/output data recorded.  1901 -  0700  In: -   Out:  [Urine:0]    General: NAD, alert, AAO x 3  HEENT: ABDIRASHID, Moist mucosa   Lungs: CTA b/l, decreased at the bases, no wheeze/rhonchi   Heart: S1S2+, RRR, no murmur  Abdo: Soft, NT, ND, +BS   : Purewick in place, + urinary incontinence   Exts: No edema, + pulses b/l   Skin: Cervical spine incisional wound       Data Review:       Recent Days:  Recent Labs     20  0925   WBC 6.1   HGB 11.9   HCT 36.8        Recent Labs     20  0400 20  0925   BUN 14 22*   CREA 0.73 0.76       Lab Results   Component Value Date/Time    C-Reactive protein <0.29 2020 06:30 AM      No results found for: SR     Microbiology   Results     Procedure Component Value Units Date/Time    CULTURE, URINE [[id number]] Collected:  20 0225    Order Status:  Completed Specimen:  Urine Updated:  20 0233    CULTURE, MRSA [[id number]]     Order Status:  Sent Specimen:  Nasal     CULTURE, Calin Bark STAIN [[id number]] Collected:  20 1245    Order Status:  Completed Specimen:  Wound Drainage Updated:  20 1402           Diagnostics   CXR Results  (Last 48 hours)    None           Assessment/Plan     1.   Postoperative cervical spine infection/wound dehiscence following posterior instrumented fusion on 2020       S/p wound debridement on , wound appears to be superficial        Intra-op CXs from  are neg MRSA screen is pending        Continue on Vanc and Ceftriaxone pending intra-op Cxs       Routine labs in the morning     2. Cervical myelopathy: S/p posterior instrumented fusion on 8/18/2020      Improved ROM in b/l UEs, L>R     3. Headache: Did not improve w Fioricet. CT head on 9/19 was negative acute findings      Pt notes her headache is much better today (09/20)    4.  Urinary incontinence: Appears to be chronic, used to have a indwelling covington cath, discontinued due to recurrent UTIs      Urine Cx done earlier today, will follow           Liliya Lenz MD    9/20/2020

## 2020-09-20 NOTE — PROGRESS NOTES
Pt's headache spontaneously resolved overnight after ordered pain regimen dosing and daily valium dose-    Consulted MD to bedside  - New orders placed    UA/C&S sent per orders    Dressing change completed to posterior neck site- packed with plain packing gauze,4x4 and clear film dressing. Incision site was clean normal coloration with scant drainage and no odor.

## 2020-09-20 NOTE — CONSULTS
Hospitalist Progress Note    Subjective:   Daily Progress Note: 9/20/2020 11:43 AM    Hospital Course:  Admitted 9/18/2020. Patient is a 70-year-old female who came in for debridement of cervical spine for postop infection. PMH significant for multiple medical problems including neurogenic bladder, breast cancer, Bell's palsy, asthma, arthritis, anxiety and depression, DM, HTN, HLD, GERD, RLS, hypothyroidism, BASIL and morbid obesity. He underwent posterior instrumented fusion on 8/18/2020 for cervical myelopathy. Returns to the hospital for wound dehiscence with persistent drainage requiring I&D and further management. Subjective:  Examined patient at the bedside. She is complaining of itching at surgical site covered and OpSite. Also complaining of headache.     Current Facility-Administered Medications   Medication Dose Route Frequency    potassium chloride SR (KLOR-CON 10) tablet 40 mEq  40 mEq Oral ONCE    sodium chloride (NS) flush 5-40 mL  5-40 mL IntraVENous Q8H    sodium chloride (NS) flush 5-40 mL  5-40 mL IntraVENous PRN    ondansetron (ZOFRAN) injection 4 mg  4 mg IntraVENous PRN    cefTRIAXone (ROCEPHIN) 2 g in 0.9% sodium chloride (MBP/ADV) 50 mL MBP  2 g IntraVENous Q24H    VANCOMYCIN INFORMATION NOTE   Other PRN    vancomycin (VANCOCIN) 1,500 mg in 0.9% sodium chloride 250 mL (VIAL-MATE)_  1,500 mg IntraVENous Q24H    [START ON 9/21/2020] VANCOMYCIN TROUGH LAB   Other ONCE    insulin lispro (HUMALOG) injection   SubCUTAneous AC&HS    glucose chewable tablet 16 g  4 Tab Oral PRN    dextrose (D50W) injection syrg 12.5-25 g  25-50 mL IntraVENous PRN    glucagon (GLUCAGEN) injection 1 mg  1 mg IntraMUSCular PRN    HYDROmorphone (DILAUDID) tablet 2 mg  2 mg Oral Q6H PRN    anastrozole (ARIMIDEX) tablet 1 mg  1 mg Oral DAILY    atorvastatin (LIPITOR) tablet 20 mg  20 mg Oral DAILY    buPROPion XL (WELLBUTRIN XL) tablet 150 mg  150 mg Oral DAILY    loratadine (CLARITIN) tablet 10 mg  10 mg Oral DAILY    diazePAM (VALIUM) tablet 5 mg  5 mg Oral DAILY    gabapentin (NEURONTIN) capsule 300 mg  300 mg Oral BID    levothyroxine (SYNTHROID) tablet 125 mcg  125 mcg Oral ACB    multivitamin (ONE A DAY) tablet 1 Tab  1 Tab Oral DAILY    oxybutynin (DITROPAN) tablet 5 mg  5 mg Oral TID    pantoprazole (PROTONIX) tablet 40 mg  40 mg Oral DAILY    sertraline (ZOLOFT) tablet 150 mg  150 mg Oral DAILY    rOPINIRole (REQUIP) tablet 3 mg  3 mg Oral QHS    sodium chloride (NS) flush 5-40 mL  5-40 mL IntraVENous Q8H    sodium chloride (NS) flush 5-40 mL  5-40 mL IntraVENous PRN    naloxone (NARCAN) injection 0.4 mg  0.4 mg IntraVENous PRN    senna-docusate (PERICOLACE) 8.6-50 mg per tablet 1 Tab  1 Tab Oral BID    polyethylene glycol (MIRALAX) packet 17 g  17 g Oral DAILY    bisacodyL (DULCOLAX) suppository 10 mg  10 mg Rectal DAILY PRN        REVIEW OF SYSTEMS    Constitutional: No fevers, No chills, No sweats, No fatigue, No Weakness    HEENT: No redness, No nasal congestion, No sore throat, No voice change    Respiratory: No Shortness of Breath, No cough, No wheezing    Cardiovascular: No chest pain, No palpitations, No extremity edema    Gastrointestinal: No nausea, No vomiting, No diarrhea, No abdominal pain    Genitourinary: No frequency, No dysuria, No hematuria    Neurological: No Confusion, No headaches, No dizziness      Objective:     Visit Vitals  /68 (BP 1 Location: Left arm, BP Patient Position: At rest)   Pulse 98   Temp 98.7 °F (37.1 °C)   Resp 18   Ht 5' (1.524 m)   Wt 97.1 kg (214 lb)   SpO2 100%   BMI 41.79 kg/m²      O2 Device: Room air    Temp (24hrs), Av.7 °F (37.1 °C), Min:98.5 °F (36.9 °C), Max:98.8 °F (37.1 °C)      No intake/output data recorded.  1901 -  0700  In: -   Out: 1900 [Urine:1900]    PHYSICAL EXAM:    Constitutional: No acute distress    Skin: Back neck with OpSite dressing C/D/I, erythema around dressing site    HEENT: Sclerae anicteric. Extra-occular muscles are intact. No oral ulcers. The neck is supple and no masses. Cardiovascular: Regular rate and rhythm. S1, S2, +pulses    Respiratory:  Clear breath sounds bilaterally with no wheezes, rales, or rhonchi. GI: Obese, abdomen nondistended, soft, and nontender. Normal active bowel sounds. : voiding, clear yellow urine    Musculoskeletal: No pitting edema of the lower legs. Able to move all ext    Neurological:  Patient is alert and oriented, cervical myopathy with related physical disability, no use of right arm, wheelchair-bound    Psychiatric: Mood appears appropriate       Data Review    Recent Results (from the past 24 hour(s))   BLOOD GAS, ARTERIAL    Collection Time: 09/19/20  8:15 PM   Result Value Ref Range    pH 7.430 7.35 - 7.45      PCO2 41 35 - 45 mmHg    PO2 86 75 - 100 mmHg    O2 SAT 96 >95 %    BICARBONATE 27 (H) 22 - 26 mmol/L    BASE EXCESS 2.6 (H) 0 - 2 mmol/L    FIO2 21.0 %    EPAP/CPAP/PEEP 0      SITE Right Brachial      LEE'S TEST PASS     GLUCOSE, POC    Collection Time: 09/20/20 12:19 AM   Result Value Ref Range    Glucose (POC) 96 65 - 100 mg/dL    Performed by [facility] RAJAN    METABOLIC PANEL, COMPREHENSIVE    Collection Time: 09/20/20  4:00 AM   Result Value Ref Range    Sodium 142 136 - 145 mmol/L    Potassium 3.6 3.5 - 5.1 mmol/L    Chloride 107 97 - 108 mmol/L    CO2 28 21 - 32 mmol/L    Anion gap 7 5 - 15 mmol/L    Glucose 86 65 - 100 mg/dL    BUN 14 6 - 20 mg/dL    Creatinine 0.73 0.55 - 1.02 mg/dL    BUN/Creatinine ratio 19 12 - 20      GFR est AA >60 >60 ml/min/1.73m2    GFR est non-AA >60 >60 ml/min/1.73m2    Calcium 9.0 8.5 - 10.1 mg/dL    Bilirubin, total 0.3 0.2 - 1.0 mg/dL    AST (SGOT) 15 15 - 37 U/L    ALT (SGPT) 17 12 - 78 U/L    Alk.  phosphatase 93 45 - 117 U/L    Protein, total 6.2 (L) 6.4 - 8.2 g/dL    Albumin 2.8 (L) 3.5 - 5.0 g/dL    Globulin 3.4 2.0 - 4.0 g/dL    A-G Ratio 0.8 (L) 1.1 - 2.2     TSH 3RD GENERATION    Collection Time: 09/20/20  4:00 AM   Result Value Ref Range    TSH 0.38 0.36 - 3.74 uIU/mL   C REACTIVE PROTEIN, QT    Collection Time: 09/20/20  6:30 AM   Result Value Ref Range    C-Reactive protein <0.29 0.00 - 0.60 mg/dL   SED RATE, AUTOMATED    Collection Time: 09/20/20  6:30 AM   Result Value Ref Range    Sed rate, automated 24 mm/hr       CT HEAD WO CONT   Final Result   IMPRESSION:    This examination is negative for acute intracranial pathology. This examination   is negative for mass effect or midline shift. The CT examination of the head has   remained stable dating back to 4/15/2019. XR SHOULDER LT AP/LAT MIN 2 V   Final Result      XR SHOULDER RT AP/LAT MIN 2 V   Final Result   IMPRESSION: No fracture or bone destruction. Probable rotator cuff tear      XR SHOULDER RT AP/LAT MIN 2 V    (Results Pending)       Active Problems:    Postoperative infection (9/18/2020)      Surgical wound dehiscence (9/18/2020)        Assessment/Plan:     1. Postoperative cervical spine infection: From instrumented fusion on 8/18/2020  Orthopedic surgery primary  I&D performed on 9/18/2020  Infectious disease consulted  Intra-Op wound cultures pending  Started on IV vancomycin and ceftriaxone  Will start Vistaril for operative site redness and itching    2. Cervical myopathy:  Status post instrumented fusion on 8/18/2020  No use right upper extremity  Wheelchair-bound    3. HTN:  Blood pressures are acceptable    4. Chronic headaches:  Started on Fioricet    5. Hypothyroidism:  Continue supplementation    6. DM:  Accu-Cheks with SSI    7. Anxiety/depression:  Continue Wellbutrin, Zoloft    8. Neurogenic bladder:  Postop bowel regimen with Arlene-Colace and MiraLAX  Continue home medication Ditropan    9. History of breast cancer:  Continue anastrozole    10. History of neuropathy:  Continue gabapentin      Philemon Huan you for allowing me to participate in care of this patient, I will follow along with you. _________________________________________________________________  Time spent in direct care including coordination of service, review of data and examination: > 35 minutes    __________________________________________________________________    Bath Iza, NP

## 2020-09-20 NOTE — PROGRESS NOTES
Pt noted to have an incision site to posterior neck- gauzw and tegardem dressing in place. Blanchable redness noted to sacrum-Healing MASD localised to pannus- area cleansed and dried- protective foam applied to sacrum-        Primary Nurse Maldonado Peres RN and  GAUTMA Najera  performed a dual skin assessment on this patient.

## 2020-09-20 NOTE — PROGRESS NOTES
Pt resting in bed hob pt has cpap on.  resp even nonlabored.   Bed in low position side rails up call light in reach

## 2020-09-21 VITALS
SYSTOLIC BLOOD PRESSURE: 142 MMHG | WEIGHT: 214 LBS | DIASTOLIC BLOOD PRESSURE: 63 MMHG | TEMPERATURE: 98.6 F | HEART RATE: 76 BPM | RESPIRATION RATE: 19 BRPM | HEIGHT: 60 IN | BODY MASS INDEX: 42.01 KG/M2 | OXYGEN SATURATION: 95 %

## 2020-09-21 LAB
ANION GAP SERPL CALC-SCNC: 7 MMOL/L (ref 5–15)
BASOPHILS # BLD: 0 K/UL (ref 0–0.1)
BASOPHILS NFR BLD: 1 % (ref 0–1)
BUN SERPL-MCNC: 13 MG/DL (ref 6–20)
BUN/CREAT SERPL: 17 (ref 12–20)
CA-I BLD-MCNC: 9.5 MG/DL (ref 8.5–10.1)
CHLORIDE SERPL-SCNC: 103 MMOL/L (ref 97–108)
CO2 SERPL-SCNC: 30 MMOL/L (ref 21–32)
CREAT SERPL-MCNC: 0.76 MG/DL (ref 0.55–1.02)
DIFFERENTIAL METHOD BLD: NORMAL
EOSINOPHIL # BLD: 0 K/UL (ref 0–0.4)
EOSINOPHIL NFR BLD: 0 % (ref 0–7)
ERYTHROCYTE [DISTWIDTH] IN BLOOD BY AUTOMATED COUNT: 13.5 % (ref 11.5–14.5)
GLUCOSE SERPL-MCNC: 83 MG/DL (ref 65–100)
HCT VFR BLD AUTO: 40.6 % (ref 35–47)
HGB BLD-MCNC: 13.1 % (ref 11.5–16)
IMM GRANULOCYTES # BLD AUTO: 0 K/UL (ref 0–0.04)
IMM GRANULOCYTES NFR BLD AUTO: 0 % (ref 0–0.5)
LYMPHOCYTES # BLD: 1.9 K/UL (ref 0.8–3.5)
LYMPHOCYTES NFR BLD: 30 % (ref 12–49)
MCH RBC QN AUTO: 28.7 PG (ref 26–34)
MCHC RBC AUTO-ENTMCNC: 32.3 G/DL (ref 30–36.5)
MCV RBC AUTO: 88.8 FL (ref 80–99)
MONOCYTES # BLD: 0.5 K/UL (ref 0–1)
MONOCYTES NFR BLD: 7 % (ref 5–13)
NEUTS SEG # BLD: 4.1 K/UL (ref 1.8–8)
NEUTS SEG NFR BLD: 62 % (ref 32–75)
PLATELET # BLD AUTO: 369 K/UL (ref 150–400)
PMV BLD AUTO: 9.3 FL (ref 8.9–12.9)
POTASSIUM SERPL-SCNC: 4 MMOL/L (ref 3.5–5.1)
RBC # BLD AUTO: 4.57 M/UL (ref 3.8–5.2)
SODIUM SERPL-SCNC: 140 MMOL/L (ref 136–145)
WBC # BLD AUTO: 6.5 K/UL (ref 3.6–11)

## 2020-09-21 PROCEDURE — 36415 COLL VENOUS BLD VENIPUNCTURE: CPT

## 2020-09-21 PROCEDURE — 80048 BASIC METABOLIC PNL TOTAL CA: CPT

## 2020-09-21 PROCEDURE — 85025 COMPLETE CBC W/AUTO DIFF WBC: CPT

## 2020-09-21 PROCEDURE — 74011250637 HC RX REV CODE- 250/637: Performed by: ORTHOPAEDIC SURGERY

## 2020-09-21 PROCEDURE — 99232 SBSQ HOSP IP/OBS MODERATE 35: CPT | Performed by: INTERNAL MEDICINE

## 2020-09-21 PROCEDURE — 82962 GLUCOSE BLOOD TEST: CPT

## 2020-09-21 RX ORDER — OXYCODONE HYDROCHLORIDE 5 MG/1
5 TABLET ORAL
Qty: 20 TAB | Refills: 0 | Status: SHIPPED | OUTPATIENT
Start: 2020-09-21 | End: 2020-09-26

## 2020-09-21 RX ORDER — GABAPENTIN 300 MG/1
300 CAPSULE ORAL 2 TIMES DAILY
Qty: 8 CAP | Refills: 0 | Status: SHIPPED | OUTPATIENT
Start: 2020-09-21 | End: 2020-09-25

## 2020-09-21 RX ADMIN — DIAZEPAM 5 MG: 5 TABLET ORAL at 09:34

## 2020-09-21 RX ADMIN — ANASTROZOLE 1 MG: 1 TABLET, COATED ORAL at 09:34

## 2020-09-21 RX ADMIN — LORATADINE 10 MG: 10 TABLET ORAL at 09:34

## 2020-09-21 RX ADMIN — BUPROPION HYDROCHLORIDE 150 MG: 150 TABLET, EXTENDED RELEASE ORAL at 09:33

## 2020-09-21 RX ADMIN — MULTIVITAMIN TABLET 1 TABLET: TABLET at 09:33

## 2020-09-21 RX ADMIN — Medication 1 ML: at 06:00

## 2020-09-21 RX ADMIN — PANTOPRAZOLE SODIUM 40 MG: 40 TABLET, DELAYED RELEASE ORAL at 09:33

## 2020-09-21 RX ADMIN — SENNOSIDES AND DOCUSATE SODIUM 1 TABLET: 8.6; 5 TABLET ORAL at 09:34

## 2020-09-21 RX ADMIN — OXYBUTYNIN CHLORIDE 5 MG: 5 TABLET ORAL at 09:34

## 2020-09-21 RX ADMIN — SERTRALINE HYDROCHLORIDE 150 MG: 50 TABLET ORAL at 09:33

## 2020-09-21 RX ADMIN — LEVOTHYROXINE SODIUM 125 MCG: 0.03 TABLET ORAL at 09:33

## 2020-09-21 RX ADMIN — GABAPENTIN 300 MG: 300 CAPSULE ORAL at 09:33

## 2020-09-21 RX ADMIN — ATORVASTATIN CALCIUM 20 MG: 20 TABLET, FILM COATED ORAL at 09:34

## 2020-09-21 RX ADMIN — POLYETHYLENE GLYCOL 3350 17 G: 17 POWDER, FOR SOLUTION ORAL at 09:37

## 2020-09-21 NOTE — PROGRESS NOTES
Infectious Disease Progress Note             Subjective:   Pt seen and examined at bedside. Doing well, denies new complaints. No acute events since last seen. Headache is much better     Objective:   Physical Exam:     Visit Vitals  BP (!) 142/63   Pulse 76   Temp 98.6 °F (37 °C)   Resp 19   Ht 5' (1.524 m)   Wt 97.1 kg (214 lb)   SpO2 95%   BMI 41.79 kg/m²      O2 Device: Room air    Temp (24hrs), Av.2 °F (36.8 °C), Min:97.1 °F (36.2 °C), Max:98.9 °F (37.2 °C)    No intake/output data recorded.  1901 -  0700  In: -   Out: 700 [Urine:700]    General: NAD, alert, AAO x 3  HEENT: ABDIRASHID, Moist mucosa   Lungs: CTA b/l, decreased at the bases, no wheeze/rhonchi   Heart: S1S2+, RRR, no murmur  Abdo: Soft, NT, ND, +BS   : Purewick in place, + urinary incontinence   Exts: No edema, + pulses b/l   Skin: Cervical spine incisional wound       Data Review:       Recent Days:  Recent Labs     20  1225 20  0630   WBC 6.5 6.3   HGB 13.1 12.0   HCT 40.6 37.2    290     Recent Labs     20  1225 20  0400   BUN 13 14   CREA 0.76 0.73       Lab Results   Component Value Date/Time    C-Reactive protein <0.29 2020 06:30 AM      No results found for: SR     Microbiology   Results     Procedure Component Value Units Date/Time    CULTURE, URINE [[id number]]  (Abnormal) Collected:  20 0225    Order Status:  Completed Specimen:  Urine Updated:  20 1430     Special Requests: No Special Requests        Cameron Count 30,000        Cameron Count colonies/ml        Culture result: Gram Negative Rods       CULTURE, MRSA [[id number]]     Order Status:  Sent Specimen:  Nasal     CULTURE, Weir [place] STAIN [[id number]] Collected:  20 1245    Order Status:  Completed Specimen:  Wound Drainage Updated:  20 1402           Diagnostics   CXR Results  (Last 48 hours)    None           Assessment/Plan     1.   Postoperative cervical spine infection/wound dehiscence following posterior instrumented fusion on 8/18/2020       S/p wound debridement on 9/18, GNR isolated from intra-op Cx 09/18       WBC WNLs, ESR 24 and CRP <0.29       Continue on Ceftriaxone pending ID of GNR, will d/c Vanc, low suspicion for MRSA infection        Discussed w ortho and wound is superficial, plan on discharging on an oral antibiotic per ID/susceptibility of GNR     2. Cervical myelopathy: S/p posterior instrumented fusion on 8/18/2020      Improved ROM in b/l UEs, L>R     3. Headache: Symptomatically better CT head on 9/19 was negative acute findings    4. UTI: Urinary incontinence.  GNR isolated from urine Cx 09/20      Ricardo Coleman MD    9/21/2020

## 2020-09-21 NOTE — PROGRESS NOTES
Progress Note  Date:2020       Room:206/01  Patient Name:Tamara Alvarez     YOB: 1955     Age:65 y.o. Subjective    44-year-old female status post I&D cervical wound. Postop day #3. Patient doing well. She complains of minimal pain at the incision site. No complaints of headache at this time. She says she is having improvement of the movement of her upper extremities. Pain medication helps. No other complaints at this time. Objective         Vitals Last 24 Hours:  TEMPERATURE:  Temp  Av °F (36.7 °C)  Min: 97.1 °F (36.2 °C)  Max: 98.9 °F (37.2 °C)  RESPIRATIONS RANGE: Resp  Av  Min: 18  Max: 20  PULSE OXIMETRY RANGE: SpO2  Av.3 %  Min: 91 %  Max: 97 %  PULSE RANGE: Pulse  Av.3  Min: 70  Max: 91  BLOOD PRESSURE RANGE: Systolic (53NSI), LMV:863 , Min:100 , HSC:262   ; Diastolic (69IZZ), FQD:93, Min:58, Max:70      I/O (24Hr): No intake or output data in the 24 hours ending 20 1138  Objective  Labs/Imaging/Diagnostics    Labs:  CBC:  Recent Labs     20  0630   WBC 6.3   RBC 4.14   HGB 12.0   HCT 37.2   MCV 89.9   RDW 12.9        CHEMISTRIES:  Recent Labs     20  0400      K 3.6      CO2 28   BUN 14   CREA 0.73   CA 9.0   PT/INR:No results for input(s): INR, INREXT in the last 72 hours. No lab exists for component: PROTIME  APTT:No results for input(s): APTT in the last 72 hours. LIVER PROFILE:  Recent Labs     20  0400   AST 15   ALT 17     Lab Results   Component Value Date/Time    ALT (SGPT) 17 2020 04:00 AM    AST (SGOT) 15 2020 04:00 AM    Alk. phosphatase 93 2020 04:00 AM    Bilirubin, total 0.3 2020 04:00 AM     All cultures are negative to date    Physical Exam:  Cervical spine: Wound dressing was removed by me. Incision site is healing well. Iodoform packing was removed. There is mild erythema around incision site.   There was a small maculopapular rash seen at the superior right lateral edge of the wound site. Minimal drainage. Minimal tenderness palpation around site. Assessment//Plan           Patient Active Problem List    Diagnosis Date Noted    Postoperative infection 09/18/2020    Surgical wound dehiscence 09/18/2020     Class: Present on Admission    Benign intracranial hypertension 09/14/2020    Body mass index (BMI) 40.0-44.9, adult ([address] 09/14/2020    DM (diabetes mellitus) ([facility] [address] 09/14/2020    GERD (gastroesophageal reflux disease) 09/14/2020    History of bariatric surgery 09/14/2020    Hypertension 09/14/2020    Hypercholesterolemia 09/14/2020    Nausea 09/14/2020    Panniculitis 09/14/2020    Pulmonary hypertension ([facility] [address] 09/14/2020    Morbid obesity ([facility] [address] 04/17/2018    Hot flashes related to aromatase inhibitor therapy 04/11/2017    Recurrent depression ([facility] [address] 04/11/2017    Breast cancer, left breast ([facility] [address] 05/30/2014     Assessment & Plan    Status post I&D cervical wound postop day #3. New dressing with extra fiber rope used for packing today. 4 x 4's with Tegaderm applied. Discussed patient with Dr. Je Valiente as well as infectious disease. Plan to discharge home on oral antibiotics.   Follow-up with Dr. Rosalino Lam as outpatient in approximately 10 days for wound check and suture removal.    Electronically signed by Leon Faustin PA-C on 9/21/2020 at 11:38 AM

## 2020-09-21 NOTE — CONSULTS
Hospitalist Progress Note    Subjective:   Daily Progress Note: 9/21/2020 11:43 AM    Hospital Course:  Admitted 9/18/2020. Patient is a 80-year-old female who came in for debridement of cervical spine for postop infection. PMH significant for multiple medical problems including neurogenic bladder, breast cancer, Bell's palsy, asthma, arthritis, anxiety and depression, DM, HTN, HLD, GERD, RLS, hypothyroidism, BASIL and morbid obesity. He underwent posterior instrumented fusion on 8/18/2020 for cervical myelopathy. Returns to the hospital for wound dehiscence with persistent drainage requiring I&D and further management. Subjective: Follow-up examination and patient at bedside. She continues to complain of itching from surgical site. States improvement in headache. She wants to get out of bed. Family member at the bedside asking about culture results, x-ray results, and plan of care.     Current Facility-Administered Medications   Medication Dose Route Frequency    hydrOXYzine pamoate (VISTARIL) capsule 25 mg  25 mg Oral TID PRN    sodium chloride (NS) flush 5-40 mL  5-40 mL IntraVENous Q8H    sodium chloride (NS) flush 5-40 mL  5-40 mL IntraVENous PRN    ondansetron (ZOFRAN) injection 4 mg  4 mg IntraVENous PRN    cefTRIAXone (ROCEPHIN) 2 g in 0.9% sodium chloride (MBP/ADV) 50 mL MBP  2 g IntraVENous Q24H    VANCOMYCIN INFORMATION NOTE   Other PRN    vancomycin (VANCOCIN) 1,500 mg in 0.9% sodium chloride 250 mL (VIAL-MATE)_  1,500 mg IntraVENous Q24H    VANCOMYCIN TROUGH LAB   Other ONCE    insulin lispro (HUMALOG) injection   SubCUTAneous AC&HS    glucose chewable tablet 16 g  4 Tab Oral PRN    dextrose (D50W) injection syrg 12.5-25 g  25-50 mL IntraVENous PRN    glucagon (GLUCAGEN) injection 1 mg  1 mg IntraMUSCular PRN    HYDROmorphone (DILAUDID) tablet 2 mg  2 mg Oral Q6H PRN    anastrozole (ARIMIDEX) tablet 1 mg  1 mg Oral DAILY    atorvastatin (LIPITOR) tablet 20 mg  20 mg Oral DAILY  buPROPion XL (WELLBUTRIN XL) tablet 150 mg  150 mg Oral DAILY    loratadine (CLARITIN) tablet 10 mg  10 mg Oral DAILY    diazePAM (VALIUM) tablet 5 mg  5 mg Oral DAILY    gabapentin (NEURONTIN) capsule 300 mg  300 mg Oral BID    levothyroxine (SYNTHROID) tablet 125 mcg  125 mcg Oral ACB    multivitamin (ONE A DAY) tablet 1 Tab  1 Tab Oral DAILY    oxybutynin (DITROPAN) tablet 5 mg  5 mg Oral TID    pantoprazole (PROTONIX) tablet 40 mg  40 mg Oral DAILY    sertraline (ZOLOFT) tablet 150 mg  150 mg Oral DAILY    rOPINIRole (REQUIP) tablet 3 mg  3 mg Oral QHS    sodium chloride (NS) flush 5-40 mL  5-40 mL IntraVENous Q8H    sodium chloride (NS) flush 5-40 mL  5-40 mL IntraVENous PRN    naloxone (NARCAN) injection 0.4 mg  0.4 mg IntraVENous PRN    senna-docusate (PERICOLACE) 8.6-50 mg per tablet 1 Tab  1 Tab Oral BID    polyethylene glycol (MIRALAX) packet 17 g  17 g Oral DAILY    bisacodyL (DULCOLAX) suppository 10 mg  10 mg Rectal DAILY PRN        REVIEW OF SYSTEMS    Constitutional: No fevers, No chills, No sweats, No fatigue, No Weakness    HEENT: No redness, No nasal congestion, No sore throat, No voice change    Respiratory: No Shortness of Breath, No cough, No wheezing    Cardiovascular: No chest pain, No palpitations, No extremity edema    Gastrointestinal: No nausea, No vomiting, No diarrhea, No abdominal pain    Genitourinary: No frequency, No dysuria, No hematuria    Neurological: No Confusion, No headaches, No dizziness      Objective:     Visit Vitals  /70   Pulse 72   Temp 97.4 °F (36.3 °C)   Resp 20   Ht 5' (1.524 m)   Wt 97.1 kg (214 lb)   SpO2 91%   BMI 41.79 kg/m²      O2 Device: Room air    Temp (24hrs), Av.7 °F (36.5 °C), Min:97.1 °F (36.2 °C), Max:98.5 °F (36.9 °C)      No intake/output data recorded.    1901 -  0700  In: -   Out: 700 [Urine:700]    PHYSICAL EXAM:    Constitutional: No acute distress    Skin: Back neck with OpSite dressing C/D/I, erythema around dressing site    HEENT: Sclerae anicteric. Extra-occular muscles are intact. No oral ulcers. The neck is supple and no masses. Cardiovascular: Regular rate and rhythm. S1, S2, +pulses    Respiratory:  Clear breath sounds bilaterally with no wheezes, rales, or rhonchi. GI: Obese, abdomen nondistended, soft, and nontender. Normal active bowel sounds. : voiding, clear yellow urine    Musculoskeletal: No pitting edema of the lower legs. Able to move all ext    Neurological:  Patient is alert and oriented, cervical myopathy with related physical disability, no use of right arm, wheelchair-bound    Psychiatric: Mood appears appropriate       Data Review    Recent Results (from the past 24 hour(s))   GLUCOSE, POC    Collection Time: 09/20/20 11:43 AM   Result Value Ref Range    Glucose (POC) 96 65 - 100 mg/dL    Performed by Bryant Kenny    GLUCOSE, POC    Collection Time: 09/20/20  2:54 PM   Result Value Ref Range    Glucose (POC) 92 65 - 100 mg/dL    Performed by Bryant Kenny    GLUCOSE, POC    Collection Time: 09/20/20  7:23 PM   Result Value Ref Range    Glucose (POC) 123 (H) 65 - 100 mg/dL    Performed by Alexandra FELICIANO        CT HEAD WO CONT   Final Result   IMPRESSION:    This examination is negative for acute intracranial pathology. This examination   is negative for mass effect or midline shift. The CT examination of the head has   remained stable dating back to 4/15/2019. XR SHOULDER RT AP/LAT MIN 2 V   Final Result   IMPRESSION: Degenerative changes of the right shoulder with no acute bony   findings. XR SHOULDER LT AP/LAT MIN 2 V   Final Result      XR SHOULDER RT AP/LAT MIN 2 V   Final Result   IMPRESSION: No fracture or bone destruction. Probable rotator cuff tear          Active Problems:    Postoperative infection (9/18/2020)      Surgical wound dehiscence (9/18/2020)        Assessment/Plan:     1.   Postoperative cervical spine infection: From instrumented fusion on 8/18/2020  Orthopedic surgery primary  I&D performed on 9/18/2020  Infectious disease consulted  Intra-Op wound cultures pending  Started on IV vancomycin and ceftriaxone  Will start Vistaril for operative site redness and itching    2. Cervical myopathy:  Status post instrumented fusion on 8/18/2020  No use right upper extremity  Wheelchair-bound-working with home PT prior to admission  Will order PT OT while inpatient    3. HTN:  Blood pressures are acceptable    4. Chronic headaches:  Started on Fioricet    5. Hypothyroidism:  Continue supplementation    6. DM:  Accu-Cheks with SSI    7. Anxiety/depression:  Continue Wellbutrin, Zoloft    8. Neurogenic bladder:  Postop bowel regimen with Arlene-Colace and MiraLAX  Continue home medication Ditropan    9. History of breast cancer:  Continue anastrozole    10.   History of neuropathy:  Continue gabapentin      _Thank you for allowing me to participate in care of this patient, I will follow along with you.  _________________________________________________________________  Time spent in direct care including coordination of service, review of data and examination: > 35 minutes    __________________________________________________________________    Rosaura Griffin NP

## 2020-09-22 LAB
COLONY COUNT,CNT: ABNORMAL
COLONY COUNT,CNT: ABNORMAL
CULTURE,CULT: ABNORMAL
CULTURE,CULT: ABNORMAL
SPECIAL REQUESTS,SREQ: ABNORMAL

## 2020-10-02 LAB
25(OH)D3+25(OH)D2 SERPL-MCNC: 41.3 NG/ML (ref 30–100)
ALBUMIN SERPL-MCNC: 4 G/DL (ref 3.8–4.8)
ALBUMIN/GLOB SERPL: 1.7 {RATIO} (ref 1.2–2.2)
ALP SERPL-CCNC: 108 IU/L (ref 39–117)
ALT SERPL-CCNC: 20 IU/L (ref 0–32)
AST SERPL-CCNC: 21 IU/L (ref 0–40)
BILIRUB SERPL-MCNC: 0.3 MG/DL (ref 0–1.2)
BUN SERPL-MCNC: 23 MG/DL (ref 8–27)
BUN/CREAT SERPL: 35 (ref 12–28)
CALCIUM SERPL-MCNC: 9.5 MG/DL (ref 8.7–10.3)
CHLORIDE SERPL-SCNC: 104 MMOL/L (ref 96–106)
CO2 SERPL-SCNC: 26 MMOL/L (ref 20–29)
CREAT SERPL-MCNC: 0.65 MG/DL (ref 0.57–1)
ERYTHROCYTE [DISTWIDTH] IN BLOOD BY AUTOMATED COUNT: 12.8 % (ref 11.7–15.4)
EST. AVERAGE GLUCOSE BLD GHB EST-MCNC: 105 MG/DL
FERRITIN SERPL-MCNC: 39 NG/ML (ref 15–150)
GLOBULIN SER CALC-MCNC: 2.4 G/DL (ref 1.5–4.5)
GLUCOSE SERPL-MCNC: 90 MG/DL (ref 65–99)
HBA1C MFR BLD: 5.3 % (ref 4.8–5.6)
HCT VFR BLD AUTO: 38.4 % (ref 34–46.6)
HGB BLD-MCNC: 12.8 G/DL (ref 11.1–15.9)
IRON SATN MFR SERPL: 21 % (ref 15–55)
IRON SERPL-MCNC: 66 UG/DL (ref 27–139)
MAGNESIUM SERPL-MCNC: 2.1 MG/DL (ref 1.6–2.3)
MCH RBC QN AUTO: 29 PG (ref 26.6–33)
MCHC RBC AUTO-ENTMCNC: 33.3 G/DL (ref 31.5–35.7)
MCV RBC AUTO: 87 FL (ref 79–97)
PHOSPHATE SERPL-MCNC: 3.6 MG/DL (ref 3–4.3)
PLATELET # BLD AUTO: 325 X10E3/UL (ref 150–450)
POTASSIUM SERPL-SCNC: 4.7 MMOL/L (ref 3.5–5.2)
PREALB SERPL-MCNC: 26 MG/DL (ref 10–36)
PROT SERPL-MCNC: 6.4 G/DL (ref 6–8.5)
RBC # BLD AUTO: 4.42 X10E6/UL (ref 3.77–5.28)
SODIUM SERPL-SCNC: 141 MMOL/L (ref 134–144)
TIBC SERPL-MCNC: 309 UG/DL (ref 250–450)
UIBC SERPL-MCNC: 243 UG/DL (ref 118–369)
VIT B1 BLD-SCNC: 130.7 NMOL/L (ref 66.5–200)
VIT B12 SERPL-MCNC: 727 PG/ML (ref 232–1245)
WBC # BLD AUTO: 5.8 X10E3/UL (ref 3.4–10.8)

## 2020-10-14 ENCOUNTER — OFFICE VISIT (OUTPATIENT)
Dept: ORTHOPEDIC SURGERY | Age: 65
End: 2020-10-14
Payer: MEDICAID

## 2020-10-14 VITALS — WEIGHT: 215 LBS | HEIGHT: 60 IN | BODY MASS INDEX: 42.21 KG/M2

## 2020-10-14 DIAGNOSIS — M17.11 PRIMARY OSTEOARTHRITIS OF RIGHT KNEE: Primary | ICD-10-CM

## 2020-10-14 DIAGNOSIS — M17.12 PRIMARY OSTEOARTHRITIS OF LEFT KNEE: ICD-10-CM

## 2020-10-14 PROCEDURE — 20611 DRAIN/INJ JOINT/BURSA W/US: CPT | Performed by: ORTHOPAEDIC SURGERY

## 2020-10-14 RX ORDER — TRIAMCINOLONE ACETONIDE 40 MG/ML
40 INJECTION, SUSPENSION INTRA-ARTICULAR; INTRAMUSCULAR ONCE
Status: COMPLETED | OUTPATIENT
Start: 2020-10-14 | End: 2020-10-14

## 2020-10-14 RX ORDER — LIDOCAINE HYDROCHLORIDE 10 MG/ML
9 INJECTION INFILTRATION; PERINEURAL ONCE
Status: COMPLETED | OUTPATIENT
Start: 2020-10-14 | End: 2020-10-14

## 2020-10-14 RX ORDER — HYDROXYZINE PAMOATE 50 MG/1
CAPSULE ORAL
COMMUNITY
Start: 2020-10-11 | End: 2020-10-14 | Stop reason: ALTCHOICE

## 2020-10-14 RX ORDER — ALPRAZOLAM 0.25 MG/1
TABLET ORAL
COMMUNITY
Start: 2020-10-06

## 2020-10-14 RX ORDER — LEVOTHYROXINE SODIUM 200 UG/1
TABLET ORAL
COMMUNITY
Start: 2020-10-11 | End: 2021-08-26 | Stop reason: SDUPTHER

## 2020-10-14 RX ORDER — FLUCONAZOLE 100 MG/1
TABLET ORAL
COMMUNITY
Start: 2020-09-30 | End: 2020-10-14 | Stop reason: ALTCHOICE

## 2020-10-14 RX ADMIN — LIDOCAINE HYDROCHLORIDE 9 ML: 10 INJECTION INFILTRATION; PERINEURAL at 15:51

## 2020-10-14 RX ADMIN — TRIAMCINOLONE ACETONIDE 40 MG: 40 INJECTION, SUSPENSION INTRA-ARTICULAR; INTRAMUSCULAR at 00:00

## 2020-10-14 RX ADMIN — TRIAMCINOLONE ACETONIDE 40 MG: 40 INJECTION, SUSPENSION INTRA-ARTICULAR; INTRAMUSCULAR at 15:51

## 2020-10-14 NOTE — LETTER
Carlos Millicent 1955  
[id number]  
 
 
10/14/2020 I hereby authorize and direct Jose Jenkins MD, Katie Richardson, and whomever he may designate as his associate to perform upon myself the following procedure: 
 
Injection of: Kenalog, Supartz, Euflexxa, Orthovisc in the Right/Left ____________________. If any unforeseen condition arises in the course of the procedure, I further authorize him and his associated and/or assistant(s) to do whatever he/she deems advisable. The nature, purpose, benefits, risks, side effects, likelihood of achieving goals, and potential problems that might occur during recuperation, risks for not receiving the proposed care, treatment and services and alternatives of the procedure have been fully explained to me by my physician including, but not limited to: 
 
Swelling, joint pain, skin pigment changes, worsening of condition, and failure to improve. I acknowledge that no guarantee or assurance has been made to me as to the results that may be obtained or the likelihood of success. _______________________________________ Signature of patient or authorized representative [employer] [facility] fax: [phone]

## 2020-10-14 NOTE — PROGRESS NOTES
Name: Thomos Lundborg    : 1955     Service Dept: [address] [place] [address] and Sports Medicine    Patient's Pharmacies:    [address] [facility] [address] [facility] [address], [address]  [address] [address]  Phone: [phone] Fax: [phone]    [address] [facility] [address] [facility], [address]  [address] [address]  Phone: [phone] Fax: [phone]    [facility] [address]  [place], [place] - [facility] [address]  [facility] [place] [address]  Phone: [phone] Fax: [phone]    [id number] [address], [facility]  [address] [facility]   Phone: [phone] Fax: [phone]       Chief Complaint   Patient presents with    Knee Pain     Bilateral        Visit Vitals  Ht 5' (1.524 m)   Wt 215 lb (97.5 kg)   BMI 41.99 kg/m²        Allergies   Allergen Reactions    Adhesive Tape-Silicones Contact Dermatitis        Current Outpatient Medications   Medication Sig Dispense Refill    ALPRAZolam (XANAX) 0.25 mg tablet       levothyroxine (SYNTHROID) 200 mcg tablet       multivitamin (ONE A DAY) tablet Take 1 Tab by mouth daily. Gummie      atorvastatin (LIPITOR) 20 mg tablet Take 1 Tab by mouth daily.  gabapentin (NEURONTIN) 300 mg capsule Take 1 Cap by mouth two (2) times a day.  sertraline (ZOLOFT) 50 mg tablet TAKE 3 TABLETS BY MOUTH ONCE DAILY IN THE MORNING . APPOINTMENT REQUIRED FOR FUTURE REFILLS 270 Tab 0    anastrozole (ARIMIDEX) 1 mg tablet TAKE 1 TABLET BY MOUTH ONCE DAILY 90 Tab 3    buPROPion SR (WELLBUTRIN SR) 150 mg SR tablet       MYRBETRIQ 50 mg ER tablet       pantoprazole (PROTONIX) 40 mg tablet Take 40 mg by mouth daily.  CALCIUM CARBONATE (CALCIUM 600 PO) Take  by mouth.  rOPINIRole (REQUIP) 2 mg tablet Take  by mouth nightly.        Current Facility-Administered Medications   Medication Dose Route Frequency Provider Last Rate Last Dose    lidocaine (XYLOCAINE) 10 mg/mL (1 %) injection 9 mL  9 mL Other ONCE Jose Abdi MD        triamcinolone acetonide (KENALOG-40) 40 mg/mL injection 40 mg  40 mg Intra artICUlar ONCE Jose Abdi MD        lidocaine (XYLOCAINE) 10 mg/mL (1 %) injection 9 mL  9 mL Other ONCE Jose Abdi MD        triamcinolone acetonide (KENALOG-40) 40 mg/mL injection 40 mg  40 mg Intra artICUlar ONCE Lauren Hidalgo MD            Patient Active Problem List   Diagnosis Code    Breast cancer, left breast ([facility] C50.912    Hot flashes related to aromatase inhibitor therapy R23.2, T45.1X5A    Recurrent depression (HCC) F33.9    Morbid obesity ([facility] E66.01    Benign intracranial hypertension G93.2    Body mass index (BMI) 40.0-44.9, adult Z68.41    DM (diabetes mellitus) ([facility] E11.9    GERD (gastroesophageal reflux disease) K21.9    History of bariatric surgery Z98.84    Hypertension I10    Hypercholesterolemia E78.00    Nausea R11.0    Panniculitis M79.3    Pulmonary hypertension (HCC) I27.20    Postoperative infection T81.40XA    Surgical wound dehiscence T81.31XA        Family History   Problem Relation Age of Onset    Cancer Mother 79        lung cancer    Diabetes Mother     Heart Disease Mother     Stroke Mother     Heart Disease Father     Stroke Father     Diabetes Brother     Heart Disease Brother     Cancer Maternal Grandmother     Heart Disease Maternal Grandmother     Stroke Maternal Grandmother     Cancer Maternal Grandfather     Heart Disease Maternal Grandfather     Cancer Paternal Grandmother     Heart Disease Paternal Grandmother     Cancer Paternal Grandfather     Heart Disease Paternal Grandfather         Social History     Socioeconomic History    Marital status:      Spouse name: Not on file    Number of children: Not on file    Years of education: Not on file    Highest education level: Not on file   Tobacco Use    Smoking status: Former Smoker     Packs/day: 3.00     Years: 20.00     Pack years: 60.00     Last attempt to quit: 1998     Years since quittin.4    Smokeless tobacco: Never Used    Tobacco comment: pt sates quit 30 years ago   Substance and Sexual Activity    Alcohol use: No    Drug use: No    Sexual activity: Never     Birth control/protection: None   Social History Narrative    Lives at home, uses motorized  Wheelchair for ambulation. Recently after cervical spine surgery able to ambulate in the house        Past Surgical History:   Procedure Laterality Date    HX BREAST BIOPSY  2014    LEFT    HX BREAST BIOPSY Left 10/20/2014    LEFT BREAST EXCISION OF NON HEALING WOUND performed by Devon Zaldivar MD at [address] HX BREAST BIOPSY Left     neg    HX BREAST LUMPECTOMY Left 2014    LEFT, stage 1    HX CERVICAL FUSION      pt states approx 1 month ago    HX DILATION AND CURETTAGE      HX GASTRIC BYPASS      pt states 2018    HX VASCULAR ACCESS      portacath in 2014, removed 2014        Past Medical History:   Diagnosis Date    Anxiety     Arrhythmia     pt states irreg heartbeat occ    Arthritis     Asthma     Bell's palsy 1979    Breast cancer ([address]     left idc    Cancer ([address] 2014    breast CA-left    Chronic pain     knees, back, hands,     Depression     attempted overdose , denies any current suicidal ideation    Diabetes ([address]     GERD (gastroesophageal reflux disease)     Hypercholesterolemia     Hypertension     Morbid obesity ([address]     Numbness and tingling in both hands     Open wound     pt states neck incision back of neck draining     Radiation therapy complication 9273    idc    Restless leg syndrome     Thyroid disease     hypothyroid    Unspecified sleep apnea     CPAP        I have reviewed and agree with [address] and ROS and intake form in chart and the record.      Review of Systems:   Patient is a pleasant appearing individual, appropriately dressed, well hydrated, well nourished, who is alert, appropriately oriented for age, and in no acute distress with a normal gait and normal affect who does not appear to be in any significant pain. Physical Exam:  Left Knee -Decrease range of motion with flexion, Knee arc of greater than 50 degrees, Some crepitation, Grossly neurovascularly intact, Good cap refill, No skin lesion, Moderate swelling, No gross instability, Some quadriceps weakness Kellgren and Daniel at least grade 3    Right Knee -Decrease range of motion with flexion, Some crepitation, Grossly neurovascularly intact, Good cap refill, No skin lesion, Moderate swelling, No gross instability, Some quadriceps weaknessKellgren and Daniel at least grade 3    Procedure Documentation:    Please note that [employer] ultrasound was used to perform an ultrasound guided injection into bilateral knees. A pre-injection ultrasound was taken of bilateral knees. After the needle was placed into the anterolateral portal(s) and while the kenelog was injected another ultrasound picture confirmed the appropriate placement. The site of injection, bilateral knees, was sterilely prepped. The injection of 40 mg Kenalog and Lidocaine was administered appropriately in bilateral knees and the patient tolerated it well. No site reaction was identified. Appropriate dressing was placed. Consent was obtained for the injection. Encounter Diagnoses     ICD-10-CM ICD-9-CM   1. Primary osteoarthritis of right knee  M17.11 715.16   2. Primary osteoarthritis of left knee  M17.12 715.16          HPI:  The patient is here with a chief complaint of bilateral knee pain. Responded well to cortisone injection before. Continues to have difficulty. Pain is 6/10. X-rays have shown severe OA. Assessment/Plan:  1. Bilateral knee osteoarthritis. Plan will be for cortisone injection to both knees. See the patient back as needed.   If she gets worse, she is to give me a call. No restrictions in the meantime. Return to Office: Follow-up and Dispositions    · Return for PRN. Scribed by Jose Moore as dictated by Vanessa Abbott. Vilma Darby MD.    Documentation True and Accepted Jose Darby MD

## 2020-10-14 NOTE — PATIENT INSTRUCTIONS
Knee Pain or Injury: Care Instructions  Your Care Instructions     Injuries are a common cause of knee problems. Sudden (acute) injuries may be caused by a direct blow to the knee. They can also be caused by abnormal twisting, bending, or falling on the knee. Pain, bruising, or swelling may be severe, and may start within minutes of the injury. Overuse is another cause of knee pain. Other causes are climbing stairs, kneeling, and other activities that use the knee. Everyday wear and tear, especially as you get older, also can cause knee pain. Rest, along with home treatment, often relieves pain and allows your knee to heal. If you have a serious knee injury, you may need tests and treatment. Follow-up care is a key part of your treatment and safety. Be sure to make and go to all appointments, and call your doctor if you are having problems. It's also a good idea to know your test results and keep a list of the medicines you take. How can you care for yourself at home? · Be safe with medicines. Read and follow all instructions on the label. ? If the doctor gave you a prescription medicine for pain, take it as prescribed. ? If you are not taking a prescription pain medicine, ask your doctor if you can take an over-the-counter medicine. · Rest and protect your knee. Take a break from any activity that may cause pain. · Put ice or a cold pack on your knee for 10 to 20 minutes at a time. Put a thin cloth between the ice and your skin. · Prop up a sore knee on a pillow when you ice it or anytime you sit or lie down for the next 3 days. Try to keep it above the level of your heart. This will help reduce swelling. · If your knee is not swollen, you can put moist heat, a heating pad, or a warm cloth on your knee. · If your doctor recommends an elastic bandage, sleeve, or other type of support for your knee, wear it as directed.   · Follow your doctor's instructions about how much weight you can put on your leg. Use a cane, crutches, or a walker as instructed. · Follow your doctor's instructions about activity during your healing process. If you can do mild exercise, slowly increase your activity. · Reach and stay at a healthy weight. Extra weight can strain the joints, especially the knees and hips, and make the pain worse. Losing even a few pounds may help. When should you call for help? Call 911 anytime you think you may need emergency care. For example, call if:    · You have symptoms of a blood clot in your lung (called a pulmonary embolism). These may include:  ? Sudden chest pain. ? Trouble breathing. ? Coughing up blood. Call your doctor now or seek immediate medical care if:    · You have severe or increasing pain.     · Your leg or foot turns cold or changes color.     · You cannot stand or put weight on your knee.     · Your knee looks twisted or bent out of shape.     · You cannot move your knee.     · You have signs of infection, such as:  ? Increased pain, swelling, warmth, or redness. ? Red streaks leading from the knee. ? Pus draining from a place on your knee. ? A fever.     · You have signs of a blood clot in your leg (called a deep vein thrombosis), such as:  ? Pain in your calf, back of the knee, thigh, or groin. ? Redness and swelling in your leg or groin. Watch closely for changes in your health, and be sure to contact your doctor if:    · You have tingling, weakness, or numbness in your knee.     · You have any new symptoms, such as swelling.     · You have bruises from a knee injury that last longer than 2 weeks.     · You do not get better as expected. Where can you learn more? Go to http://www.gray.com/  Enter K195 in the search box to learn more about \"Knee Pain or Injury: Care Instructions. \"  Current as of: June 26, 2019               Content Version: 12.6  © 2318-6392 [employer].    Care instructions adapted under license by [employer] (which disclaims liability or warranty for this information). If you have questions about a medical condition or this instruction, always ask your healthcare professional. [address] any warranty or liability for your use of this information.

## 2021-01-25 NOTE — PROGRESS NOTES
[facility] at [address], [address] [place] [address]  W: [phone]  F: [phone]      Reason for Visit:   Flavia Mike is a 60 y.o. female who is seen for follow up of breast cancer. Treatment History:   · Mammogram and ultrasound 2014 at [facility]: BIRADS 5 left breast abnormality, increasing in size compared with exam 6 months prior  · Patient declined preoperative biopsy  · Left lumpectomy and sentinel node dissection 2014 with Dr. Vika Lipscomb: 2cm infiltrating ductal carcinoma, grade 3, %, RI 30%, HER2/jesusita negative, Ki67 30%, 0/12 nodes. Prosigna score 77 = high risk. · Stage IA (pT1c pN0 Mx) Left Breast Cancer  · Chemotherapy with TC x4 cycles from 7/10/14 to 2014  · Adjuvant radiation with Dr. Norma Foy 14 - 14  · Adjuvant endocrine therapy with Anastrozole beginning 1/3/2015     History of Present Illness:   She returns for follow up, last seen in 2019 and subsequently lost to follow up. She has had several surgeries since I saw her last, including neck surgery and a panniculectomy. She had some complications from these, but she reports recovering well now. Last drain was removed earlier this month. She has continued on anastrozole. Has been off this for the past week, after running out. No hot flashes. No breast complaints. She is accompanied by an aid today. Her   since I saw her last.      Review of systems was obtained and pertinent findings reviewed above. Past medical history, social history, family history, medications, and allergies are located in the electronic medical record.       Physical Exam:     Visit Vitals  /79 (BP 1 Location: Right arm, BP Patient Position: Sitting)   Pulse 93   Temp 98.1 °F (36.7 °C) (Oral)   Resp 16   Ht 5' (1.524 m)   Wt 225 lb (102.1 kg)   SpO2 96%   BMI 43.94 kg/m²     ECOG PS: 0  General: no distress  Respiratory: normal respiratory effort  CV: no peripheral edema  Skin: no rashes; no ecchymoses; no petechiae    Results:     Lab Results   Component Value Date/Time    WBC 5.8 09/28/2020 12:00 AM    HGB 12.8 09/28/2020 12:00 AM    HCT 38.4 09/28/2020 12:00 AM    PLATELET 325 09/28/2020 12:00 AM    MCV 87 09/28/2020 12:00 AM    ABS. NEUTROPHILS 4.1 09/21/2020 12:25 PM    Hemoglobin (POC) 10.2 (L) 09/19/2014 02:56 PM    Hematocrit (POC) 30 (L) 09/19/2014 02:56 PM     Lab Results   Component Value Date/Time    Sodium 141 09/28/2020 12:00 AM    Potassium 4.7 09/28/2020 12:00 AM    Chloride 104 09/28/2020 12:00 AM    CO2 26 09/28/2020 12:00 AM    Glucose 90 09/28/2020 12:00 AM    BUN 23 09/28/2020 12:00 AM    Creatinine 0.65 09/28/2020 12:00 AM    GFR est  09/28/2020 12:00 AM    GFR est non-AA 94 09/28/2020 12:00 AM    Calcium 9.5 09/28/2020 12:00 AM    Sodium (POC) 138 09/19/2014 02:56 PM    Potassium (POC) 4.5 09/19/2014 02:56 PM    Chloride (POC) 102 09/19/2014 02:56 PM    Glucose (POC) 123 (H) 09/20/2020 07:23 PM    BUN (POC) 19 09/19/2014 02:56 PM    Creatinine (POC) 1.1 09/19/2014 02:56 PM    Calcium, ionized (POC) 1.17 09/19/2014 02:56 PM     Lab Results   Component Value Date/Time    Bilirubin, total 0.3 09/28/2020 12:00 AM    ALT (SGPT) 20 09/28/2020 12:00 AM    Alk. phosphatase 108 09/28/2020 12:00 AM    Protein, total 6.4 09/28/2020 12:00 AM    Albumin 4.0 09/28/2020 12:00 AM    Globulin 3.4 09/20/2020 04:00 AM       DEXA 11/11/2014: normal   DEXA 11/03/2016: normal  DEXA 11/20/2018: normal      Mammogram 7/07/2015: benign  Mammogram 6/28/2016: benign  Bilateral mammogram and left breast US 7/20/2017: benign  Bilateral mammogram and left breast US 11/20/2018: benign  Bilateral mammogram and left breast US 2/25/2020: benign    Breast biopsy 3/27/2017 by Dr. Pearson: benign breast tissue with fibrocystic changes. microcalcifications and focal chronic inflammation identified.     Assessment:   1) Breast Cancer, Left  Stage  IA, ER/DE positive, HER2 negative, high risk prosigna score  She is s/p resection followed by adjuvant chemotherapy with TC x 4 and adjuvant radiation completed 12/2014. She is now on adjuvant endocrine therapy with anastrozole, with plans for 5 years of therapy. She has no evidence of recurrence at this time. She is over 6 years out from radiation. I recommend stopping the anastrozole and continuing with yearly monitoring with H&P, breast exam, and mammogram.  We discussed that this can be done with her PCP, though she prefers to continue following here for now, with plans to move to [place] in the future. Continue yearly mammogram.    2) Bone Health  Increased risk of osteoporosis while on AI therapy. Continue calcium and vitamin D. Last DEXA normal.  Due for repeat. 3) Depression  Improved. Continues to follow with psychiatry. Medication recently adjusted. 4) Hot flashes  Resolved recently. 5) Obesity  Improving after bariatric surgery.     Plan:     · D/c anastrazole  · Continue Calcium, Vitamin D  · Mammogram yearly, due 2/2021  · DEXA every 2 years, due now  · Return to see me in 2/2022, after mammogram      Signed By: Hima Carpio MD

## 2021-01-26 ENCOUNTER — OFFICE VISIT (OUTPATIENT)
Dept: ONCOLOGY | Age: 66
End: 2021-01-26
Payer: MEDICAID

## 2021-01-26 VITALS
BODY MASS INDEX: 44.17 KG/M2 | DIASTOLIC BLOOD PRESSURE: 79 MMHG | WEIGHT: 225 LBS | HEART RATE: 93 BPM | SYSTOLIC BLOOD PRESSURE: 125 MMHG | RESPIRATION RATE: 16 BRPM | TEMPERATURE: 98.1 F | OXYGEN SATURATION: 96 % | HEIGHT: 60 IN

## 2021-01-26 DIAGNOSIS — C50.912 MALIGNANT NEOPLASM OF LEFT BREAST IN FEMALE, ESTROGEN RECEPTOR POSITIVE, UNSPECIFIED SITE OF BREAST (HCC): Primary | ICD-10-CM

## 2021-01-26 DIAGNOSIS — Z17.0 MALIGNANT NEOPLASM OF LEFT BREAST IN FEMALE, ESTROGEN RECEPTOR POSITIVE, UNSPECIFIED SITE OF BREAST (HCC): Primary | ICD-10-CM

## 2021-01-26 PROCEDURE — 99214 OFFICE O/P EST MOD 30 MIN: CPT | Performed by: INTERNAL MEDICINE

## 2021-01-26 RX ORDER — ROPINIROLE 3 MG/1
TABLET, FILM COATED ORAL
COMMUNITY
Start: 2021-01-05

## 2021-01-26 RX ORDER — CETIRIZINE HCL 10 MG
TABLET ORAL
COMMUNITY
Start: 2020-12-14

## 2021-01-26 RX ORDER — FUROSEMIDE 40 MG/1
TABLET ORAL
COMMUNITY
Start: 2021-01-10

## 2021-01-26 RX ORDER — GABAPENTIN 400 MG/1
CAPSULE ORAL
COMMUNITY
Start: 2020-11-25 | End: 2021-08-26 | Stop reason: SDUPTHER

## 2021-01-26 RX ORDER — HYDROXYZINE PAMOATE 50 MG/1
CAPSULE ORAL
COMMUNITY
Start: 2021-01-10

## 2021-01-26 NOTE — PROGRESS NOTES
Dilma Ewing is a 60 y.o. female follow up for breast cancer. 1. Have you been to the ER, urgent care clinic since your last visit? Hospitalized since your last visit?no     2. Have you seen or consulted any other health care providers outside of the [address] [place] [address] since your last visit? Include any pap smears or colon screening.  no

## 2021-02-12 ENCOUNTER — HOSPITAL ENCOUNTER (OUTPATIENT)
Dept: MAMMOGRAPHY | Age: 66
Discharge: HOME | End: 2021-02-12
Attending: INTERNAL MEDICINE
Payer: MEDICAID

## 2021-02-12 DIAGNOSIS — C50.912 MALIGNANT NEOPLASM OF LEFT BREAST IN FEMALE, ESTROGEN RECEPTOR POSITIVE, UNSPECIFIED SITE OF BREAST (HCC): ICD-10-CM

## 2021-02-12 DIAGNOSIS — Z17.0 MALIGNANT NEOPLASM OF LEFT BREAST IN FEMALE, ESTROGEN RECEPTOR POSITIVE, UNSPECIFIED SITE OF BREAST (HCC): ICD-10-CM

## 2021-02-12 PROCEDURE — 77063 BREAST TOMOSYNTHESIS BI: CPT

## 2021-02-12 PROCEDURE — 77080 DXA BONE DENSITY AXIAL: CPT

## 2021-02-15 NOTE — PROGRESS NOTES
02/15/21- Informed pt of results she verbalized understanding. No further questions or concerns. Also informed patient of mammogram results as well per - normal repeat in one year.

## 2021-08-25 ENCOUNTER — OFFICE VISIT (OUTPATIENT)
Dept: ORTHOPEDIC SURGERY | Age: 66
End: 2021-08-25
Payer: MEDICAID

## 2021-08-25 DIAGNOSIS — M17.12 OSTEOARTHRITIS OF LEFT KNEE, UNSPECIFIED OSTEOARTHRITIS TYPE: ICD-10-CM

## 2021-08-25 DIAGNOSIS — M25.562 PAIN IN BOTH KNEES, UNSPECIFIED CHRONICITY: Primary | ICD-10-CM

## 2021-08-25 DIAGNOSIS — M25.561 PAIN IN BOTH KNEES, UNSPECIFIED CHRONICITY: Primary | ICD-10-CM

## 2021-08-25 DIAGNOSIS — M17.11 OSTEOARTHRITIS OF RIGHT KNEE, UNSPECIFIED OSTEOARTHRITIS TYPE: ICD-10-CM

## 2021-08-25 PROCEDURE — 20611 DRAIN/INJ JOINT/BURSA W/US: CPT | Performed by: ORTHOPAEDIC SURGERY

## 2021-08-25 PROCEDURE — 99214 OFFICE O/P EST MOD 30 MIN: CPT | Performed by: ORTHOPAEDIC SURGERY

## 2021-08-25 RX ORDER — LIDOCAINE HYDROCHLORIDE 10 MG/ML
9 INJECTION INFILTRATION; PERINEURAL ONCE
Status: COMPLETED | OUTPATIENT
Start: 2021-08-25 | End: 2021-08-25

## 2021-08-25 RX ORDER — TRIAMCINOLONE ACETONIDE 40 MG/ML
40 INJECTION, SUSPENSION INTRA-ARTICULAR; INTRAMUSCULAR ONCE
Status: COMPLETED | OUTPATIENT
Start: 2021-08-25 | End: 2021-08-25

## 2021-08-25 RX ADMIN — LIDOCAINE HYDROCHLORIDE 9 ML: 10 INJECTION INFILTRATION; PERINEURAL at 14:09

## 2021-08-25 RX ADMIN — TRIAMCINOLONE ACETONIDE 40 MG: 40 INJECTION, SUSPENSION INTRA-ARTICULAR; INTRAMUSCULAR at 14:09

## 2021-08-25 RX ADMIN — TRIAMCINOLONE ACETONIDE 40 MG: 40 INJECTION, SUSPENSION INTRA-ARTICULAR; INTRAMUSCULAR at 14:10

## 2021-08-25 NOTE — PATIENT INSTRUCTIONS
Knee Pain or Injury: Care Instructions  Your Care Instructions     Injuries are a common cause of knee problems. Sudden (acute) injuries may be caused by a direct blow to the knee. They can also be caused by abnormal twisting, bending, or falling on the knee. Pain, bruising, or swelling may be severe, and may start within minutes of the injury. Overuse is another cause of knee pain. Other causes are climbing stairs, kneeling, and other activities that use the knee. Everyday wear and tear, especially as you get older, also can cause knee pain. Rest, along with home treatment, often relieves pain and allows your knee to heal. If you have a serious knee injury, you may need tests and treatment. Follow-up care is a key part of your treatment and safety. Be sure to make and go to all appointments, and call your doctor if you are having problems. It's also a good idea to know your test results and keep a list of the medicines you take. How can you care for yourself at home? · Be safe with medicines. Read and follow all instructions on the label. ? If the doctor gave you a prescription medicine for pain, take it as prescribed. ? If you are not taking a prescription pain medicine, ask your doctor if you can take an over-the-counter medicine. · Rest and protect your knee. Take a break from any activity that may cause pain. · Put ice or a cold pack on your knee for 10 to 20 minutes at a time. Put a thin cloth between the ice and your skin. · Prop up a sore knee on a pillow when you ice it or anytime you sit or lie down for the next 3 days. Try to keep it above the level of your heart. This will help reduce swelling. · If your knee is not swollen, you can put moist heat, a heating pad, or a warm cloth on your knee. · If your doctor recommends an elastic bandage, sleeve, or other type of support for your knee, wear it as directed.   · Follow your doctor's instructions about how much weight you can put on your leg. Use a cane, crutches, or a walker as instructed. · Follow your doctor's instructions about activity during your healing process. If you can do mild exercise, slowly increase your activity. · Reach and stay at a healthy weight. Extra weight can strain the joints, especially the knees and hips, and make the pain worse. Losing even a few pounds may help. When should you call for help? Call 911 anytime you think you may need emergency care. For example, call if:    · You have symptoms of a blood clot in your lung (called a pulmonary embolism). These may include:  ? Sudden chest pain. ? Trouble breathing. ? Coughing up blood. Call your doctor now or seek immediate medical care if:    · You have severe or increasing pain.     · Your leg or foot turns cold or changes color.     · You cannot stand or put weight on your knee.     · Your knee looks twisted or bent out of shape.     · You cannot move your knee.     · You have signs of infection, such as:  ? Increased pain, swelling, warmth, or redness. ? Red streaks leading from the knee. ? Pus draining from a place on your knee. ? A fever.     · You have signs of a blood clot in your leg (called a deep vein thrombosis), such as:  ? Pain in your calf, back of the knee, thigh, or groin. ? Redness and swelling in your leg or groin. Watch closely for changes in your health, and be sure to contact your doctor if:    · You have tingling, weakness, or numbness in your knee.     · You have any new symptoms, such as swelling.     · You have bruises from a knee injury that last longer than 2 weeks.     · You do not get better as expected. Where can you learn more? Go to http://www.gray.com/  Enter K195 in the search box to learn more about \"Knee Pain or Injury: Care Instructions. \"  Current as of: February 26, 2020               Content Version: 12.8  © 6349-1248 [employer].    Care instructions adapted under license by [employer] (which disclaims liability or warranty for this information). If you have questions about a medical condition or this instruction, always ask your healthcare professional. Jennifer Zaragoza any warranty or liability for your use of this information.

## 2021-08-25 NOTE — LETTER
8/26/2021    Patient: Shon Alvarado   YOB: 1955   Date of Visit: 8/25/2021     Geraldine Khalil NP  [facility] [id number]  Via Fax: [phone]    Dear Geraldine Khalil NP,      Thank you for referring Ms. Sandoval [facility] to [address] [place] [address] AND [facility] for evaluation. My notes for this consultation are attached. If you have questions, please do not hesitate to call me. I look forward to following your patient along with you.       Sincerely,    Kari Donahue MD

## 2021-08-25 NOTE — PROGRESS NOTES
Name: Therese Cardenas    : 1955     Service Dept: [address] [place] [address] and Sports Medicine    Patient's Pharmacies:    [address], [address]  [address] [place] [address]  Phone: [phone] Fax: [phone]    [address], [address]  [address] [place] [address]  Phone: [phone] Fax: [phone]    [facility] [address]  [place] [address] [address] [facility]. Dyll[address] E[address] [address]  Phone: [phone] Fax: [phone]    [facility]/pharmacy #[id number]- Evelyn Levin[address] [address]  Phone: [phone] Fax: [phone]       No chief complaint on file. There were no vitals taken for this visit. Allergies   Allergen Reactions    Adhesive Tape-Silicones Contact Dermatitis      Current Outpatient Medications   Medication Sig Dispense Refill    cetirizine (ZYRTEC) 10 mg tablet TAKE 1 TABLET BY MOUTH EVERY DAY      hydrOXYzine pamoate (VISTARIL) 50 mg capsule TAKE 1 CAPSULE BY MOUTH EVERY 6 HOURS AS NEEDED FOR ITCHING      furosemide (LASIX) 40 mg tablet ORAL TAKE 1 TABLET BY MOUTH TWICE DAILY AS NEEDED      rOPINIRole (REQUIP) 3 mg tab tab TAKE 1 TABLET AT BEDTIME      gabapentin (NEURONTIN) 400 mg capsule TAKE 1 CAPSULE BY MOUTH THREE TIMES A DAY      ALPRAZolam (XANAX) 0.25 mg tablet       levothyroxine (SYNTHROID) 200 mcg tablet       multivitamin (ONE A DAY) tablet Take 1 Tab by mouth daily. Gummie      atorvastatin (LIPITOR) 20 mg tablet Take 1 Tab by mouth daily.  sertraline (ZOLOFT) 50 mg tablet TAKE 3 TABLETS BY MOUTH ONCE DAILY IN THE MORNING .  APPOINTMENT REQUIRED FOR FUTURE REFILLS 270 Tab 0    anastrozole (ARIMIDEX) 1 mg tablet TAKE 1 TABLET BY MOUTH ONCE DAILY 90 Tab 3    buPROPion SR (WELLBUTRIN SR) 150 mg SR tablet       MYRBETRIQ 50 mg ER tablet       pantoprazole (PROTONIX) 40 mg tablet Take 40 mg by mouth daily.  CALCIUM CARBONATE (CALCIUM 600 PO) Take  by mouth.  rOPINIRole (REQUIP) 2 mg tablet Take  by mouth nightly.         Patient Active Problem List   Diagnosis Code    Breast cancer, left breast ([facility] C50.912    Hot flashes related to aromatase inhibitor therapy R23.2, T45.1X5A    Recurrent depression ([facility] F33.9    Morbid obesity ([facility] E66.01    Benign intracranial hypertension G93.2    Body mass index (BMI) 40.0-44.9, adult Z68.41    DM (diabetes mellitus) ([facility] E11.9    GERD (gastroesophageal reflux disease) K21.9    History of bariatric surgery Z98.84    Hypertension I10    Hypercholesterolemia E78.00    Nausea R11.0    Panniculitis M79.3    Pulmonary hypertension ([facility] I27.20    Postoperative infection T81.40XA    Surgical wound dehiscence T81.31XA      Family History   Problem Relation Age of Onset    Cancer Mother 79        lung cancer    Diabetes Mother     Heart Disease Mother     Stroke Mother     Heart Disease Father     Stroke Father     Diabetes Brother     Heart Disease Brother     Cancer Maternal Grandmother     Heart Disease Maternal Grandmother     Stroke Maternal Grandmother     Cancer Maternal Grandfather     Heart Disease Maternal Grandfather     Cancer Paternal Grandmother     Heart Disease Paternal Grandmother     Cancer Paternal Grandfather     Heart Disease Paternal Grandfather       Social History     Socioeconomic History    Marital status:      Spouse name: Not on file    Number of children: Not on file    Years of education: Not on file    Highest education level: Not on file   Tobacco Use    Smoking status: Former Smoker     Packs/day: 3.00     Years: 20.00     Pack years: 60.00     Quit date: 1998     Years since quittin.2    Smokeless tobacco: Never Used    Tobacco comment: pt sates quit 30 years ago   Substance and Sexual Activity    Alcohol use: No    Drug use: No    Sexual activity: Never     Birth control/protection: None   Social History Narrative    Lives at home, uses motorized  Wheelchair for ambulation. Recently after cervical spine surgery able to ambulate in the house     Social Determinants of Health     Financial Resource Strain:     Difficulty of Paying Living Expenses:    Food Insecurity:     Worried About Running Out of Food in the Last Year:     [address] in the Last Year:    Transportation Needs:     Lack of Transportation (Medical):      Lack of Transportation (Non-Medical):    Physical Activity:     Days of Exercise per Week:     Minutes of Exercise per Session:    Stress:     Feeling of Stress :    Social Connections:     Frequency of Communication with Friends and Family:     Frequency of Social Gatherings with Friends and Family:     Attends Spiritism Services:     Active Member of Clubs or Organizations:     Attends Club or Organization Meetings:     Marital Status:       Past Surgical History:   Procedure Laterality Date    HX BREAST BIOPSY  5/28/2014    LEFT    HX BREAST BIOPSY Left 10/20/2014    LEFT BREAST EXCISION OF NON HEALING WOUND performed by Meliton Infante MD at [address] HX BREAST BIOPSY Left 2017    neg    HX BREAST LUMPECTOMY Left 5/28/2014    LEFT, stage 1    HX CERVICAL FUSION      pt states approx 1 month ago    HX DILATION AND CURETTAGE      HX GASTRIC BYPASS      pt states 2018    HX VASCULAR ACCESS      portacath in 7/2014, removed 9/2014      Past Medical History:   Diagnosis Date    Anxiety     Arrhythmia     pt states irreg heartbeat occ    Arthritis     Asthma     Bell's palsy 1979    Breast cancer ([address] 2014    left idc    Cancer ([address] 4/2014    breast CA-left    Chronic pain     knees, back, hands,     Depression     attempted overdose 2010, denies any current suicidal ideation    Diabetes ([address]     GERD (gastroesophageal reflux disease)     Hypercholesterolemia     Hypertension  Morbid obesity (HCC)     Numbness and tingling in both hands     Open wound     pt states neck incision back of neck draining     Radiation therapy complication 8992    idc    Restless leg syndrome     Thyroid disease     hypothyroid    Unspecified sleep apnea     CPAP        I have reviewed and agree with [address] [place] [address] Nw and ROS and intake form in chart and the record furthermore I have reviewed prior medical record(s) regarding this patients care during this appointment. Review of Systems:   Patient is a pleasant appearing individual, appropriately dressed, well hydrated, well nourished, who is alert, appropriately oriented for age, and in no acute distress with a normal gait and normal affect who does not appear to be in any significant pain. Physical Exam:  Left Knee -Decrease range of motion with flexion, Knee arc of greater than 50 degrees, Some crepitation, Grossly neurovascularly intact, Good cap refill, No skin lesion, Moderate swelling, No gross instability, Some quadriceps weakness Kellgren and Daniel at least grade 3    Right Knee -Decrease range of motion with flexion, Some crepitation, Grossly neurovascularly intact, Good cap refill, No skin lesion, Moderate swelling, No gross instability, Some quadriceps weaknessKellgren and Daniel at least grade 3    Procedure Documentation:    I discussed in detail the risks, benefits and complications of an injection which included but are not limited to infection, skin reactions, hot swollen joint, and anaphylaxis with the patient. The patient verbalized understanding and gave informed consent for the injection. The patient's knees were flexed to 90° and the skin prepped using sterile alcohol solution. A sterile needle was inserted into the bilateral knee(s) and the mixture of 9 mL Lidocaine 1%, 1 mL Kenalog 40 mg was injected under sterile technique. The needle was withdrawn and the puncture site sealed with a Band-Aid.       Technique: Under sterile conditions a [employer] ultrasound unit with a variable frequency (7.0-14.0 MHz) linear transducer was used to localize the placement of needle into the bilateral knee(s) joint. Findings: Successful needle placement for knee injection. Final images were taken and saved for permanent record. The patient tolerated the injection well. The patient was instructed to call the office immediately if there is any pain, redness, warmth, fever, or chills. Encounter Diagnoses     ICD-10-CM ICD-9-CM   1. Pain in both knees, unspecified chronicity  M25.561 719.46    M25.562    2. Osteoarthritis of right knee, unspecified osteoarthritis type  M17.11 715.96   3. Osteoarthritis of left knee, unspecified osteoarthritis type  M17.12 715.96       HPI:  The patient is here with a chief complaint of bilateral knee pain, dull throbbing pain, progressively getting worse. Pain is 10/10. X-rays are positive for OA in the past.    Assessment/Plan:  Plan would be for cortisone injection in both knees. If it helps, it is all we need to do. We will see her back as needed. Continue weight loss program.      As part of continued conservative pain management options the patient was advised to utilize Tylenol or OTC NSAIDS as long as it is not medically contraindicated. Return to Office: Follow-up and Dispositions    · Return if symptoms worsen or fail to improve.         Administrations This Visit     lidocaine (XYLOCAINE) 10 mg/mL (1 %) injection 9 mL     Admin Date  08/25/2021 Action  Given Dose  9 mL Route  Other Administered By  Denilson Hawkins LPN    Admin Date  43/50/1005 Action  Given Dose  9 mL Route  Other Administered By  Denilson Hawkins LPN          triamcinolone acetonide (KENALOG-40) 40 mg/mL injection 40 mg     Admin Date  08/25/2021 Action  Given Dose  40 mg Route  Intra artICUlar Administered By  Denilson Hawkins LPN           Admin Date  08/25/2021 Action  Given Dose  40 mg Route  Intra artICUlar Administered By  Dani Griffin LPN               Scribed by Corbin Dhaliwal LPN as dictated by Raymond Mcarthur MD.  Documentation True and Accepted Jose Mcarthur MD

## 2021-08-25 NOTE — LETTER
Jarrodsilvia Fuentes   1955   [id number]       8/25/2021       I hereby authorize and direct Jose Agosto MD, Fernandez Wilson, and whomever he may designate as his associate to perform upon myself the following procedure:    Injection of: Kenalog, Supartz, Euflexxa, Orthovisc in the Right/Left ____________________. If any unforeseen condition arises in the course of the procedure, I further authorize him and his associated and/or assistant(s) to do whatever he/she deems advisable. The nature, purpose, benefits, risks, side effects, likelihood of achieving goals, and potential problems that might occur during recuperation, risks for not receiving the proposed care, treatment and services and alternatives of the procedure have been fully explained to me by my physician including, but not limited to:    Swelling, joint pain, skin pigment changes, worsening of condition, and failure to improve. I acknowledge that no guarantee or assurance has been made to me as to the results that may be obtained or the likelihood of success.                 _______________________________________     Signature of patient or authorized representative                [employer] and Sports Medicine fax: [phone] Discharged

## 2021-08-26 RX ORDER — SERTRALINE HYDROCHLORIDE 100 MG/1
TABLET, FILM COATED ORAL
COMMUNITY
Start: 2021-06-22

## 2021-08-26 RX ORDER — GABAPENTIN 300 MG/1
CAPSULE ORAL
COMMUNITY
Start: 2021-08-09

## 2021-08-26 RX ORDER — TOLTERODINE TARTRATE 1 MG/1
TABLET, EXTENDED RELEASE ORAL
COMMUNITY
Start: 2021-07-28

## 2021-08-26 RX ORDER — LEVOTHYROXINE SODIUM 150 UG/1
TABLET ORAL
COMMUNITY
Start: 2021-06-22

## 2021-08-26 RX ORDER — CHLORPHENIRAMINE MALEATE 4 MG
TABLET ORAL
COMMUNITY
Start: 2021-08-24

## 2021-11-06 ENCOUNTER — TRANSCRIBE ORDER (OUTPATIENT)
Dept: SCHEDULING | Age: 66
End: 2021-11-06

## 2021-11-06 DIAGNOSIS — L72.9 SKIN CYST: Primary | ICD-10-CM

## 2021-11-09 ENCOUNTER — HOSPITAL ENCOUNTER (OUTPATIENT)
Dept: ULTRASOUND IMAGING | Age: 66
Discharge: HOME | End: 2021-11-09
Attending: NURSE PRACTITIONER
Payer: MEDICAID

## 2021-11-09 DIAGNOSIS — L72.9 SKIN CYST: ICD-10-CM

## 2021-11-09 PROCEDURE — 76882 US LMTD JT/FCL EVL NVASC XTR: CPT

## 2022-02-22 ENCOUNTER — TRANSCRIBE ORDER (OUTPATIENT)
Dept: SCHEDULING | Age: 67
End: 2022-02-22

## 2022-02-22 DIAGNOSIS — Z12.31 SCREENING MAMMOGRAM FOR HIGH-RISK PATIENT: Primary | ICD-10-CM

## 2022-03-01 ENCOUNTER — DOCUMENTATION ONLY (OUTPATIENT)
Dept: ONCOLOGY | Age: 67
End: 2022-03-01

## 2022-03-03 ENCOUNTER — TELEPHONE (OUTPATIENT)
Dept: ONCOLOGY | Age: 67
End: 2022-03-03

## 2022-03-18 PROBLEM — I27.20 PULMONARY HYPERTENSION (HCC): Status: ACTIVE | Noted: 2020-09-14

## 2022-03-18 PROBLEM — E11.9 DM (DIABETES MELLITUS) (HCC): Status: ACTIVE | Noted: 2020-09-14

## 2022-03-18 PROBLEM — Z98.84 HISTORY OF BARIATRIC SURGERY: Status: ACTIVE | Noted: 2020-09-14

## 2022-03-18 PROBLEM — G93.2 BENIGN INTRACRANIAL HYPERTENSION: Status: ACTIVE | Noted: 2020-09-14

## 2022-03-19 PROBLEM — M79.3 PANNICULITIS: Status: ACTIVE | Noted: 2020-09-14

## 2022-03-19 PROBLEM — R11.0 NAUSEA: Status: ACTIVE | Noted: 2020-09-14

## 2022-03-19 PROBLEM — T81.31XA SURGICAL WOUND DEHISCENCE: Status: ACTIVE | Noted: 2020-09-18

## 2022-03-19 PROBLEM — R23.2 HOT FLASHES RELATED TO AROMATASE INHIBITOR THERAPY: Status: ACTIVE | Noted: 2017-04-11

## 2022-03-19 PROBLEM — F33.9 RECURRENT DEPRESSION (HCC): Status: ACTIVE | Noted: 2017-04-11

## 2022-03-19 PROBLEM — K21.9 GERD (GASTROESOPHAGEAL REFLUX DISEASE): Status: ACTIVE | Noted: 2020-09-14

## 2022-03-19 PROBLEM — E78.00 HYPERCHOLESTEROLEMIA: Status: ACTIVE | Noted: 2020-09-14

## 2022-03-19 PROBLEM — I10 HYPERTENSION: Status: ACTIVE | Noted: 2020-09-14

## 2022-03-19 PROBLEM — T45.1X5A HOT FLASHES RELATED TO AROMATASE INHIBITOR THERAPY: Status: ACTIVE | Noted: 2017-04-11

## 2022-03-19 PROBLEM — E66.01 MORBID OBESITY (HCC): Status: ACTIVE | Noted: 2018-04-17

## 2022-03-20 PROBLEM — T81.40XA POSTOPERATIVE INFECTION: Status: ACTIVE | Noted: 2020-09-18

## 2022-07-12 ENCOUNTER — TRANSCRIBE ORDER (OUTPATIENT)
Dept: SCHEDULING | Age: 67
End: 2022-07-12

## 2022-07-12 DIAGNOSIS — Z12.31 SCREENING MAMMOGRAM FOR HIGH-RISK PATIENT: Primary | ICD-10-CM

## 2022-08-11 ENCOUNTER — HOSPITAL ENCOUNTER (OUTPATIENT)
Dept: MAMMOGRAPHY | Age: 67
Discharge: HOME | End: 2022-08-11
Attending: INTERNAL MEDICINE
Payer: MEDICARE

## 2022-08-11 DIAGNOSIS — Z12.31 SCREENING MAMMOGRAM FOR HIGH-RISK PATIENT: ICD-10-CM

## 2022-08-11 PROCEDURE — 77063 BREAST TOMOSYNTHESIS BI: CPT

## 2022-08-11 PROCEDURE — 77067 SCR MAMMO BI INCL CAD: CPT

## 2022-10-15 ENCOUNTER — HOSPITAL ENCOUNTER (EMERGENCY)
Age: 67
Discharge: HOME | End: 2022-10-15
Attending: EMERGENCY MEDICINE
Payer: MEDICARE

## 2022-10-15 ENCOUNTER — APPOINTMENT (OUTPATIENT)
Dept: GENERAL RADIOLOGY | Age: 67
End: 2022-10-15
Attending: EMERGENCY MEDICINE
Payer: MEDICARE

## 2022-10-15 VITALS
WEIGHT: 253 LBS | TEMPERATURE: 98.4 F | DIASTOLIC BLOOD PRESSURE: 64 MMHG | RESPIRATION RATE: 22 BRPM | OXYGEN SATURATION: 96 % | HEIGHT: 60 IN | HEART RATE: 76 BPM | SYSTOLIC BLOOD PRESSURE: 112 MMHG | BODY MASS INDEX: 49.67 KG/M2

## 2022-10-15 DIAGNOSIS — M54.50 ACUTE BILATERAL LOW BACK PAIN WITHOUT SCIATICA: Primary | ICD-10-CM

## 2022-10-15 LAB
APPEARANCE UR: CLEAR
BILIRUB UR QL: NEGATIVE
COLOR UR: NORMAL
GLUCOSE UR STRIP.AUTO-MCNC: NEGATIVE MG/DL
HGB UR QL STRIP: NEGATIVE
KETONES UR QL STRIP.AUTO: NEGATIVE MG/DL
LEUKOCYTE ESTERASE UR QL STRIP.AUTO: NEGATIVE
NITRITE UR QL STRIP.AUTO: NEGATIVE
PH UR STRIP: 7 [PH] (ref 5–8)
PROT UR STRIP-MCNC: NEGATIVE MG/DL
SP GR UR REFRACTOMETRY: 1.01 (ref 1–1.03)
UROBILINOGEN UR QL STRIP.AUTO: 0.2 EU/DL (ref 0.2–1)

## 2022-10-15 PROCEDURE — 81003 URINALYSIS AUTO W/O SCOPE: CPT

## 2022-10-15 PROCEDURE — 96372 THER/PROPH/DIAG INJ SC/IM: CPT

## 2022-10-15 PROCEDURE — 99284 EMERGENCY DEPT VISIT MOD MDM: CPT

## 2022-10-15 PROCEDURE — 74011250636 HC RX REV CODE- 250/636: Performed by: EMERGENCY MEDICINE

## 2022-10-15 PROCEDURE — 72170 X-RAY EXAM OF PELVIS: CPT

## 2022-10-15 PROCEDURE — 72100 X-RAY EXAM L-S SPINE 2/3 VWS: CPT

## 2022-10-15 RX ORDER — OXYCODONE HYDROCHLORIDE 5 MG/1
5 TABLET ORAL
Qty: 5 TABLET | Refills: 0 | Status: SHIPPED | OUTPATIENT
Start: 2022-10-15 | End: 2022-10-18

## 2022-10-15 RX ORDER — IBUPROFEN 600 MG/1
600 TABLET ORAL
Qty: 20 TABLET | Refills: 0 | Status: SHIPPED | OUTPATIENT
Start: 2022-10-15

## 2022-10-15 RX ORDER — METHOCARBAMOL 750 MG/1
750 TABLET, FILM COATED ORAL
Qty: 15 TABLET | Refills: 0 | Status: SHIPPED | OUTPATIENT
Start: 2022-10-15 | End: 2022-10-29

## 2022-10-15 RX ORDER — LIDOCAINE 50 MG/G
PATCH TOPICAL
Qty: 15 EACH | Refills: 0 | Status: SHIPPED | OUTPATIENT
Start: 2022-10-15

## 2022-10-15 RX ORDER — KETOROLAC TROMETHAMINE 30 MG/ML
30 INJECTION, SOLUTION INTRAMUSCULAR; INTRAVENOUS ONCE
Status: COMPLETED | OUTPATIENT
Start: 2022-10-15 | End: 2022-10-15

## 2022-10-15 RX ORDER — HYDROMORPHONE HYDROCHLORIDE 1 MG/ML
1 INJECTION, SOLUTION INTRAMUSCULAR; INTRAVENOUS; SUBCUTANEOUS ONCE
Status: COMPLETED | OUTPATIENT
Start: 2022-10-15 | End: 2022-10-15

## 2022-10-15 RX ORDER — ACETAMINOPHEN 325 MG/1
650 TABLET ORAL
Qty: 20 TABLET | Refills: 0 | Status: SHIPPED | OUTPATIENT
Start: 2022-10-15

## 2022-10-15 RX ADMIN — HYDROMORPHONE HYDROCHLORIDE 1 MG: 1 INJECTION, SOLUTION INTRAMUSCULAR; INTRAVENOUS; SUBCUTANEOUS at 13:10

## 2022-10-15 RX ADMIN — KETOROLAC TROMETHAMINE 30 MG: 30 INJECTION, SOLUTION INTRAMUSCULAR at 13:10

## 2022-10-15 NOTE — ED TRIAGE NOTES
Per Pt, slept in a twin bed last night w/ her granddaughter; woke up this morning; pain w/ movement in right lower back. Tender on palpation. Assisted from W/C to stretcher. Pain 9/10 w/ movement.

## 2022-10-17 NOTE — ED PROVIDER NOTES
EMERGENCY DEPARTMENT HISTORY AND PHYSICAL EXAM      Date: 10/15/2022  Patient Name: Valerie Contreras    History of Presenting Illness     Chief Complaint   Patient presents with    Back Pain     Right lower        History Provided By: Patient    HPI: Valerie Contreras, 78 y.o. female w a hx of chronic back pains presents with lower back pain that began this morning after sleeping on a twin bed with her granddaughter last night. Pain with minimal movement. Pt examined in a left lateral decubitus postiion due to discomfort. Worse with any movements. Improves laying still. No urinary symptoms. No bowel or bladder incontinence. No numbness or tingling. No weakness. States her crhonic pain means she does not ambulate much at baseline but can usually 'cruise' from one object to another to get around but today has been in too much pain. There are no other complaints, changes, or physical findings at this time. PCP: William Alvarado NP    Current Outpatient Medications   Medication Sig Dispense Refill    lidocaine (LIDODERM) 5 % Apply patch to the affected area for 12 hours a day and remove for 12 hours a day. 15 Each 0    oxyCODONE IR (Roxicodone) 5 mg immediate release tablet Take 1 Tablet by mouth every six (6) hours as needed for Pain for up to 3 days. Max Daily Amount: 20 mg. 5 Tablet 0    acetaminophen (TYLENOL) 325 mg tablet Take 2 Tablets by mouth every four (4) hours as needed for Pain. 20 Tablet 0    methocarbamoL (ROBAXIN) 750 mg tablet Take 1 Tablet by mouth three (3) times daily as needed for Muscle Spasm(s) for up to 14 days. 15 Tablet 0    ibuprofen (MOTRIN) 600 mg tablet Take 1 Tablet by mouth every six (6) hours as needed for Pain.  20 Tablet 0    clotrimazole (LOTRIMIN) 1 % topical cream       tolterodine (DETROL) 1 mg tablet TAKE 1 TABLET BY MOUTH TWICE A DAY FOR OVERACTIVE BLADDER      sertraline (ZOLOFT) 100 mg tablet       gabapentin (NEURONTIN) 300 mg capsule TAKE 2 CAPSULES BY MOUTH TWICE A DAY levothyroxine (SYNTHROID) 150 mcg tablet       cetirizine (ZYRTEC) 10 mg tablet TAKE 1 TABLET BY MOUTH EVERY DAY      hydrOXYzine pamoate (VISTARIL) 50 mg capsule TAKE 1 CAPSULE BY MOUTH EVERY 6 HOURS AS NEEDED FOR ITCHING      furosemide (LASIX) 40 mg tablet ORAL TAKE 1 TABLET BY MOUTH TWICE DAILY AS NEEDED      rOPINIRole (REQUIP) 3 mg tab tab TAKE 1 TABLET AT BEDTIME      ALPRAZolam (XANAX) 0.25 mg tablet       multivitamin (ONE A DAY) tablet Take 1 Tab by mouth daily. Gummie      atorvastatin (LIPITOR) 20 mg tablet Take 1 Tab by mouth daily. anastrozole (ARIMIDEX) 1 mg tablet TAKE 1 TABLET BY MOUTH ONCE DAILY 90 Tab 3    buPROPion SR (WELLBUTRIN SR) 150 mg SR tablet       MYRBETRIQ 50 mg ER tablet       pantoprazole (PROTONIX) 40 mg tablet Take 40 mg by mouth daily. CALCIUM CARBONATE (CALCIUM 600 PO) Take  by mouth. rOPINIRole (REQUIP) 2 mg tablet Take  by mouth nightly.          Past History   Past Medical History:  Past Medical History:   Diagnosis Date    Anxiety     Arrhythmia     pt states irreg heartbeat occ    Arthritis     Asthma     Bell's palsy 1979    Breast cancer ([address] 2014    left idc    Cancer ([address] 4/2014    breast CA-left    Chronic pain     knees, back, hands,     Depression     attempted overdose 2010, denies any current suicidal ideation    Diabetes ([address]     GERD (gastroesophageal reflux disease)     Hypercholesterolemia     Hypertension     Morbid obesity ([address]     Numbness and tingling in both hands     Open wound     pt states neck incision back of neck draining     Radiation therapy complication 2330    idc    Restless leg syndrome     Thyroid disease     hypothyroid    Unspecified sleep apnea     CPAP       Past Surgical History:  Past Surgical History:   Procedure Laterality Date    HX BREAST BIOPSY  5/28/2014    LEFT    HX BREAST BIOPSY Left 10/20/2014    LEFT BREAST EXCISION OF NON HEALING WOUND performed by Fady Sargent MD at [facility] Left 2017    neg    HX BREAST LUMPECTOMY Left 2014    LEFT, stage 1    HX CERVICAL FUSION      pt states approx 1 month ago    HX DILATION AND CURETTAGE      HX GASTRIC BYPASS      pt states 2018    HX VASCULAR ACCESS      portacath in 2014, removed 2014       Family History:  Family History   Problem Relation Age of Onset    Cancer Mother 79        lung cancer    Diabetes Mother     Heart Disease Mother     Stroke Mother     Heart Disease Father     Stroke Father     Diabetes Brother     Heart Disease Brother     Cancer Maternal Grandmother     Heart Disease Maternal Grandmother     Stroke Maternal Grandmother     Cancer Maternal Grandfather     Heart Disease Maternal Grandfather     Cancer Paternal Grandmother     Heart Disease Paternal Grandmother     Cancer Paternal Grandfather     Heart Disease Paternal Grandfather        Social History:  Social History     Tobacco Use    Smoking status: Former     Packs/day: 3.00     Years: 20.00     Pack years: 60.00     Types: Cigarettes     Quit date: 1998     Years since quittin.4    Smokeless tobacco: Never    Tobacco comments:     pt sates quit 30 years ago   Substance Use Topics    Alcohol use: No    Drug use: No       Allergies: Allergies   Allergen Reactions    Adhesive Tape-Silicones Contact Dermatitis     Review of Systems   Review of Systems   Constitutional:  Negative for appetite change and fever. HENT: Negative. Eyes: Negative. Respiratory: Negative. Negative for shortness of breath. Gastrointestinal:  Negative for abdominal pain and nausea. Genitourinary: Negative. Negative for decreased urine volume, difficulty urinating, dyspareunia, flank pain, frequency, hematuria, menstrual problem, pelvic pain, vaginal bleeding, vaginal discharge and vaginal pain. Musculoskeletal:  Positive for arthralgias, back pain and myalgias. Negative for joint swelling. Skin:  Negative for color change, rash and wound.    Neurological: Negative. Negative for weakness and numbness. Psychiatric/Behavioral:  The patient is not nervous/anxious. Physical Exam   Physical Exam  Vitals and nursing note reviewed. Exam conducted with a chaperone present. Constitutional:       General: She is in acute distress. Appearance: Normal appearance. She is normal weight. She is not ill-appearing, toxic-appearing or diaphoretic. HENT:      Head: Normocephalic and atraumatic. Nose: Nose normal.      Mouth/Throat:      Mouth: Mucous membranes are moist.      Pharynx: Oropharynx is clear. Eyes:      Extraocular Movements: Extraocular movements intact. Conjunctiva/sclera: Conjunctivae normal.      Pupils: Pupils are equal, round, and reactive to light. Cardiovascular:      Rate and Rhythm: Normal rate and regular rhythm. Pulses: Normal pulses. Heart sounds: Normal heart sounds. Pulmonary:      Effort: Pulmonary effort is normal. No respiratory distress. Breath sounds: Normal breath sounds. Abdominal:      General: Abdomen is flat. Bowel sounds are normal. There is no distension. Palpations: Abdomen is soft. Tenderness: There is no abdominal tenderness. There is no guarding. Musculoskeletal:         General: Tenderness present. No swelling, deformity or signs of injury. Cervical back: Normal, normal range of motion and neck supple. Thoracic back: Normal.      Lumbar back: Spasms and tenderness present. No swelling, deformity, lacerations or bony tenderness. Decreased range of motion. Positive right straight leg raise test and positive left straight leg raise test.        Back:       Right lower leg: No edema. Left lower leg: No edema. Skin:     General: Skin is warm and dry. Capillary Refill: Capillary refill takes less than 2 seconds. Findings: No lesion or rash. Neurological:      General: No focal deficit present.       Mental Status: She is alert and oriented to person, place, and time. Mental status is at baseline. Cranial Nerves: No cranial nerve deficit. Psychiatric:         Mood and Affect: Mood normal.         Behavior: Behavior normal.         Thought Content: Thought content normal.         Judgment: Judgment normal.         Medical Decision Making and ED Course   Differential Diagnosis & Medical Decision Making Provider Note:   76JJ w recurrent low back pain acutely worse due to sleeping positino last night. Had been unable to get out of bed today due to pain. She is stable, pain is much improved but still present. Ua and plain films negative for acute process. Discussed with patient who feels she can manage at home at this point. She agrees to followup closely with pmd. She has no red flag symptoms and no neuro involvement identified. - I am the first and primary provider for this patient. I reviewed the vital signs, available nursing notes, past medical history, past surgical history, family history and social history. The patient's presenting problems have been discussed, and the staff are in agreement with the care plan formulated and outlined with them. I have encouraged them to ask questions as they arise throughout their visit. Vital Signs-Reviewed the patient's vital signs     Disposition   Disposition: Condition stable and improved  DC- Adult Discharges: All of the diagnostic tests were reviewed and questions answered. Diagnosis, care plan and treatment options were discussed. The patient understands the instructions and will follow up as directed. The patients results have been reviewed with them. They have been counseled regarding their diagnosis. The patient verbally convey understanding and agreement of the signs, symptoms, diagnosis, treatment and prognosis and additionally agrees to follow up as recommended with their PCP in 24 - 48 hours. They also agree with the care-plan and convey that all of their questions have been answered.   I have also put together some discharge instructions for them that include: 1) educational information regarding their diagnosis, 2) how to care for their diagnosis at home, as well a 3) list of reasons why they would want to return to the ED prior to their follow-up appointment, should their condition change. DC- Pain Control DC Home plan: Nonsteroidals, Tylenol, Narcotic pain medication, Muscle relaxants, Analgesics, and Referral Family Medicine/PCP and Orthopedics    DISCHARGE PLAN:  1. Cannot display discharge medications since this patient is not currently admitted. 2.   Follow-up Information       Follow up With Specialties Details Why Contact Stevie Dc NP Nurse Practitioner In 3 days For followup and recheck of todays symptoms [address] [facility]  [phone]      [facility] EMERGENCY DEPT Emergency Medicine Go to  As needed, or for any concerns or deteriorations. , if symptoms persist or worsen. [address] [place] [address]  [phone]          3. Return to ED if worse   4. Discharge Medication List as of 10/15/2022  3:55 PM        START taking these medications    Details   lidocaine (LIDODERM) 5 % Apply patch to the affected area for 12 hours a day and remove for 12 hours a day., Normal, Disp-15 Each, R-0      oxyCODONE IR (Roxicodone) 5 mg immediate release tablet Take 1 Tablet by mouth every six (6) hours as needed for Pain for up to 3 days. Max Daily Amount: 20 mg., Normal, Disp-5 Tablet, R-0      acetaminophen (TYLENOL) 325 mg tablet Take 2 Tablets by mouth every four (4) hours as needed for Pain., Normal, Disp-20 Tablet, R-0      methocarbamoL (ROBAXIN) 750 mg tablet Take 1 Tablet by mouth three (3) times daily as needed for Muscle Spasm(s) for up to 14 days. , Normal, Disp-15 Tablet, R-0      ibuprofen (MOTRIN) 600 mg tablet Take 1 Tablet by mouth every six (6) hours as needed for Pain., Normal, Disp-20 Tablet, R-0           CONTINUE these medications which have NOT CHANGED    Details   clotrimazole (LOTRIMIN) 1 % topical cream Historical Med      tolterodine (DETROL) 1 mg tablet TAKE 1 TABLET BY MOUTH TWICE A DAY FOR OVERACTIVE BLADDER, Historical Med      sertraline (ZOLOFT) 100 mg tablet Historical Med      gabapentin (NEURONTIN) 300 mg capsule TAKE 2 CAPSULES BY MOUTH TWICE A DAY, Historical Med      levothyroxine (SYNTHROID) 150 mcg tablet Historical Med      cetirizine (ZYRTEC) 10 mg tablet TAKE 1 TABLET BY MOUTH EVERY DAY, Historical Med      hydrOXYzine pamoate (VISTARIL) 50 mg capsule TAKE 1 CAPSULE BY MOUTH EVERY 6 HOURS AS NEEDED FOR ITCHING, Historical Med      furosemide (LASIX) 40 mg tablet ORAL TAKE 1 TABLET BY MOUTH TWICE DAILY AS NEEDED, Historical Med      !! rOPINIRole (REQUIP) 3 mg tab tab TAKE 1 TABLET AT BEDTIME, Historical Med      ALPRAZolam (XANAX) 0.25 mg tablet Historical Med      multivitamin (ONE A DAY) tablet Take 1 Tab by mouth daily. Gummie, Historical Med      atorvastatin (LIPITOR) 20 mg tablet Take 1 Tab by mouth daily. , Historical Med      anastrozole (ARIMIDEX) 1 mg tablet TAKE 1 TABLET BY MOUTH ONCE DAILY, Normal, Disp-90 Tab, R-3      buPROPion SR (WELLBUTRIN SR) 150 mg SR tablet Historical Med      MYRBETRIQ 50 mg ER tablet Historical Med, CHELLY      pantoprazole (PROTONIX) 40 mg tablet Take 40 mg by mouth daily. , Historical Med      CALCIUM CARBONATE (CALCIUM 600 PO) Take  by mouth., Historical Med      !! rOPINIRole (REQUIP) 2 mg tablet Take  by mouth nightly., Historical Med       !! - Potential duplicate medications found. Please discuss with provider. Diagnosis/Clinical Impression     Clinical Impression:   1. Acute bilateral low back pain without sciatica        Attestations: Veronica Owens MD, am the primary clinician of record. Please note that this dictation was completed with dotCloud, the [employer] voice recognition software.   Quite often unanticipated grammatical, syntax, homophones, and other interpretive errors are inadvertently transcribed by the computer software. Please disregard these errors. Please excuse any errors that have escaped final proofreading. Thank you.

## 2023-04-21 DIAGNOSIS — Z12.31 SCREENING MAMMOGRAM FOR HIGH-RISK PATIENT: Primary | ICD-10-CM

## 2023-05-25 RX ORDER — ANASTROZOLE 1 MG/1
1 TABLET ORAL DAILY
COMMUNITY
Start: 2019-12-02

## 2023-05-25 RX ORDER — LEVOTHYROXINE SODIUM 0.15 MG/1
TABLET ORAL
COMMUNITY
Start: 2021-06-22

## 2023-05-25 RX ORDER — CLOTRIMAZOLE 1 %
CREAM (GRAM) TOPICAL
COMMUNITY
Start: 2021-08-24

## 2023-05-25 RX ORDER — SERTRALINE HYDROCHLORIDE 100 MG/1
TABLET, FILM COATED ORAL
COMMUNITY
Start: 2021-06-22

## 2023-05-25 RX ORDER — TOLTERODINE TARTRATE 1 MG/1
TABLET, EXTENDED RELEASE ORAL
COMMUNITY
Start: 2021-07-28

## 2023-05-25 RX ORDER — ATORVASTATIN CALCIUM 20 MG/1
1 TABLET, FILM COATED ORAL DAILY
COMMUNITY
Start: 2019-01-22

## 2023-05-25 RX ORDER — ROPINIROLE 2 MG/1
TABLET, FILM COATED ORAL
COMMUNITY

## 2023-05-25 RX ORDER — ACETAMINOPHEN 325 MG/1
650 TABLET ORAL EVERY 4 HOURS PRN
COMMUNITY
Start: 2022-10-15

## 2023-05-25 RX ORDER — GABAPENTIN 300 MG/1
2 CAPSULE ORAL 2 TIMES DAILY
COMMUNITY
Start: 2021-08-09

## 2023-05-25 RX ORDER — HYDROXYZINE PAMOATE 50 MG/1
CAPSULE ORAL
COMMUNITY
Start: 2021-01-10

## 2023-05-25 RX ORDER — BUPROPION HYDROCHLORIDE 150 MG/1
TABLET, EXTENDED RELEASE ORAL
COMMUNITY
Start: 2018-11-09

## 2023-05-25 RX ORDER — ALPRAZOLAM 0.25 MG/1
TABLET ORAL
COMMUNITY
Start: 2020-10-06

## 2023-05-25 RX ORDER — IBUPROFEN 600 MG/1
600 TABLET ORAL EVERY 6 HOURS PRN
COMMUNITY
Start: 2022-10-15

## 2023-05-25 RX ORDER — LIDOCAINE 50 MG/G
PATCH TOPICAL
COMMUNITY
Start: 2022-10-15

## 2023-05-25 RX ORDER — ROPINIROLE 3 MG/1
1 TABLET, FILM COATED ORAL NIGHTLY
COMMUNITY
Start: 2021-01-05

## 2023-05-25 RX ORDER — CETIRIZINE HYDROCHLORIDE 10 MG/1
1 TABLET ORAL DAILY
COMMUNITY
Start: 2020-12-14

## 2023-05-25 RX ORDER — FUROSEMIDE 40 MG/1
TABLET ORAL
COMMUNITY
Start: 2021-01-10

## 2023-05-25 RX ORDER — PANTOPRAZOLE SODIUM 40 MG/1
40 TABLET, DELAYED RELEASE ORAL DAILY
COMMUNITY
Start: 2018-11-09

## 2023-07-24 ENCOUNTER — HOSPITAL ENCOUNTER (EMERGENCY)
Facility: HOSPITAL | Age: 68
Discharge: HOME | End: 2023-07-24
Attending: EMERGENCY MEDICINE
Payer: MEDICARE

## 2023-07-24 ENCOUNTER — APPOINTMENT (OUTPATIENT)
Facility: HOSPITAL | Age: 68
End: 2023-07-24
Payer: MEDICARE

## 2023-07-24 VITALS
OXYGEN SATURATION: 97 % | HEART RATE: 84 BPM | DIASTOLIC BLOOD PRESSURE: 73 MMHG | WEIGHT: 245 LBS | RESPIRATION RATE: 20 BRPM | SYSTOLIC BLOOD PRESSURE: 129 MMHG | BODY MASS INDEX: 48.1 KG/M2 | TEMPERATURE: 98.5 F | HEIGHT: 60 IN

## 2023-07-24 DIAGNOSIS — M16.0 PRIMARY OSTEOARTHRITIS OF BOTH HIPS: ICD-10-CM

## 2023-07-24 DIAGNOSIS — M47.816 OSTEOARTHRITIS OF LUMBAR SPINE, UNSPECIFIED SPINAL OSTEOARTHRITIS COMPLICATION STATUS: Primary | ICD-10-CM

## 2023-07-24 LAB
APPEARANCE UR: CLEAR
BACTERIA URNS QL MICRO: NEGATIVE /HPF
BILIRUB UR QL: NEGATIVE
COLOR UR: ABNORMAL
GLUCOSE UR STRIP.AUTO-MCNC: NEGATIVE MG/DL
HGB UR QL STRIP: ABNORMAL
KETONES UR QL STRIP.AUTO: NEGATIVE MG/DL
LEUKOCYTE ESTERASE UR QL STRIP.AUTO: NEGATIVE
NITRITE UR QL STRIP.AUTO: NEGATIVE
PH UR STRIP: 7.5 (ref 5–8)
PROT UR STRIP-MCNC: NEGATIVE MG/DL
RBC #/AREA URNS HPF: NORMAL /HPF (ref 0–3)
SP GR UR REFRACTOMETRY: 1.01 (ref 1–1.03)
UROBILINOGEN UR QL STRIP.AUTO: 0.2 EU/DL (ref 0.2–1)
WBC URNS QL MICRO: NORMAL /HPF (ref 0–5)

## 2023-07-24 PROCEDURE — 6360000002 HC RX W HCPCS: Performed by: EMERGENCY MEDICINE

## 2023-07-24 PROCEDURE — 81001 URINALYSIS AUTO W/SCOPE: CPT

## 2023-07-24 PROCEDURE — 2580000003 HC RX 258: Performed by: EMERGENCY MEDICINE

## 2023-07-24 PROCEDURE — 99284 EMERGENCY DEPT VISIT MOD MDM: CPT

## 2023-07-24 PROCEDURE — 96372 THER/PROPH/DIAG INJ SC/IM: CPT

## 2023-07-24 PROCEDURE — 74176 CT ABD & PELVIS W/O CONTRAST: CPT

## 2023-07-24 RX ORDER — FENTANYL CITRATE 50 UG/ML
100 INJECTION, SOLUTION INTRAMUSCULAR; INTRAVENOUS
Status: COMPLETED | OUTPATIENT
Start: 2023-07-24 | End: 2023-07-24

## 2023-07-24 RX ORDER — PREDNISONE 20 MG/1
20 TABLET ORAL DAILY
Qty: 5 TABLET | Refills: 0 | Status: SHIPPED | OUTPATIENT
Start: 2023-07-24 | End: 2023-07-24 | Stop reason: SDUPTHER

## 2023-07-24 RX ORDER — PREDNISONE 20 MG/1
20 TABLET ORAL DAILY
Qty: 5 TABLET | Refills: 0 | Status: SHIPPED | OUTPATIENT
Start: 2023-07-24 | End: 2023-07-29

## 2023-07-24 RX ORDER — KETOROLAC TROMETHAMINE 30 MG/ML
30 INJECTION, SOLUTION INTRAMUSCULAR; INTRAVENOUS ONCE
Status: COMPLETED | OUTPATIENT
Start: 2023-07-24 | End: 2023-07-24

## 2023-07-24 RX ADMIN — KETOROLAC TROMETHAMINE 30 MG: 30 INJECTION, SOLUTION INTRAMUSCULAR; INTRAVENOUS at 16:26

## 2023-07-24 RX ADMIN — WATER 125 MG: 1 INJECTION INTRAMUSCULAR; INTRAVENOUS; SUBCUTANEOUS at 16:27

## 2023-07-24 RX ADMIN — FENTANYL CITRATE 100 MCG: 50 INJECTION, SOLUTION INTRAMUSCULAR; INTRAVENOUS at 18:20

## 2023-07-24 ASSESSMENT — PAIN SCALES - GENERAL: PAINLEVEL_OUTOF10: 10

## 2023-07-24 ASSESSMENT — PAIN DESCRIPTION - ORIENTATION: ORIENTATION: LOWER

## 2023-07-24 ASSESSMENT — PAIN DESCRIPTION - LOCATION: LOCATION: BACK

## 2023-07-24 ASSESSMENT — PAIN - FUNCTIONAL ASSESSMENT: PAIN_FUNCTIONAL_ASSESSMENT: 0-10

## 2023-07-24 NOTE — ED NOTES
Pt was assisted up to bedside commode- states pain is a little better. States she needs more pain med. Pt has her home sitter at bedside- who is very attentive.  Helped pt get back in bed     Helen Chou RN  07/24/23 [id number]

## 2023-07-24 NOTE — ED TRIAGE NOTES
Pt reported she started with lower back pain last night but was so bad when she woke up this am she is unable to stand pt reported this has happened before

## 2023-08-01 ENCOUNTER — TRANSCRIBE ORDERS (OUTPATIENT)
Facility: HOSPITAL | Age: 68
End: 2023-08-01

## 2023-08-01 DIAGNOSIS — Z12.31 BREAST CANCER SCREENING BY MAMMOGRAM: Primary | ICD-10-CM

## 2023-08-31 ENCOUNTER — HOSPITAL ENCOUNTER (OUTPATIENT)
Facility: HOSPITAL | Age: 68
Discharge: HOME | End: 2023-09-03
Attending: INTERNAL MEDICINE

## 2023-08-31 DIAGNOSIS — Z12.31 BREAST CANCER SCREENING BY MAMMOGRAM: ICD-10-CM

## 2024-10-09 NOTE — PROGRESS NOTES
CM met with patient and received a choice for HH. Patient was accepted by [facility]. Patient is being discharged today back to home with [facility] services starting on 09/24/2020. No

## (undated) DEVICE — DRAPE, HEAVY DUTY, STERILE. FOR A 6' BIG CASE BACK TABLE MODEL 429: Brand: OR SPECIFIC

## (undated) DEVICE — 3M™ IOBAN™ 2 ANTIMICROBIAL INCISE DRAPE 6650EZ: Brand: IOBAN™ 2

## (undated) DEVICE — BASIC SINGLE BASIN-LF: Brand: MEDLINE INDUSTRIES, INC.

## (undated) DEVICE — INTENDED FOR TISSUE SEPARATION, AND OTHER PROCEDURES THAT REQUIRE A SHARP SURGICAL BLADE TO PUNCTURE OR CUT.: Brand: BARD-PARKER SAFETY BLADES SIZE 15, STERILE

## (undated) DEVICE — DRAPE,REIN 53X77,STERILE: Brand: MEDLINE

## (undated) DEVICE — CULTURETTE SGL EVAC TUBE PALL -- 100/CA

## (undated) DEVICE — SYRINGE IRRIG 60ML SFT PLIABLE BLB EZ TO GRP 1 HND USE W/

## (undated) DEVICE — GARMENT CMPR STD UNV CALF FLWT -- RN VENTILATE NS DISP 17- IN

## (undated) DEVICE — REM POLYHESIVE ADULT PATIENT RETURN ELECTRODE: Brand: VALLEYLAB

## (undated) DEVICE — DRAPE SURG TOWEL SM 18X12 IN INVISISHIELD MP W/ ADH PLAS NS

## (undated) DEVICE — STERILE POLYISOPRENE POWDER-FREE SURGICAL GLOVES WITH EMOLLIENT COATING: Brand: PROTEXIS

## (undated) DEVICE — STERILE POLYISOPRENE POWDER-FREE SURGICAL GLOVES: Brand: PROTEXIS

## (undated) DEVICE — TURNOVER KIT A GEN SURG

## (undated) DEVICE — PREP SKN DURAPREP 26ML APPL --

## (undated) DEVICE — HEX-LOCKING BLADE ELECTRODE: Brand: EDGE

## (undated) DEVICE — SUT VCRL + 2-0 36IN CT1 UD --

## (undated) DEVICE — LAMINECTOMY ARM CRADLE FOAM POSITIONER: Brand: CARDINAL HEALTH

## (undated) DEVICE — SOL IRR SOD CL 0.9% 1000ML BTL --

## (undated) DEVICE — COVER LT HNDL BLU PLAS

## (undated) DEVICE — CHS NEURO PLUS 1: Brand: MEDLINE INDUSTRIES, INC.

## (undated) DEVICE — SUTURE ETHLN SZ 2-0 L18IN NONABSORBABLE BLK L26MM FS 3/8 664G

## (undated) DEVICE — CORD BPLR 12FT SGL USE CLR

## (undated) DEVICE — MAGNETIC INSTRUMENT PAD 16" X 20"; LARGE; DISPOSABLE: Brand: CARDINAL HEALTH